# Patient Record
Sex: FEMALE | Race: ASIAN | NOT HISPANIC OR LATINO | URBAN - METROPOLITAN AREA
[De-identification: names, ages, dates, MRNs, and addresses within clinical notes are randomized per-mention and may not be internally consistent; named-entity substitution may affect disease eponyms.]

---

## 2019-05-01 ENCOUNTER — OUTPATIENT (OUTPATIENT)
Dept: OUTPATIENT SERVICES | Facility: HOSPITAL | Age: 84
LOS: 1 days | End: 2019-05-01
Payer: MEDICAID

## 2019-05-01 PROCEDURE — G9001: CPT

## 2019-05-20 ENCOUNTER — EMERGENCY (EMERGENCY)
Facility: HOSPITAL | Age: 84
LOS: 1 days | Discharge: HOME | End: 2019-05-20
Attending: EMERGENCY MEDICINE
Payer: MEDICAID

## 2019-05-20 VITALS
WEIGHT: 160.06 LBS | TEMPERATURE: 99 F | HEART RATE: 70 BPM | OXYGEN SATURATION: 97 % | RESPIRATION RATE: 20 BRPM | SYSTOLIC BLOOD PRESSURE: 128 MMHG | DIASTOLIC BLOOD PRESSURE: 72 MMHG

## 2019-05-20 VITALS
TEMPERATURE: 98 F | HEART RATE: 73 BPM | RESPIRATION RATE: 18 BRPM | DIASTOLIC BLOOD PRESSURE: 80 MMHG | SYSTOLIC BLOOD PRESSURE: 131 MMHG | OXYGEN SATURATION: 96 %

## 2019-05-20 DIAGNOSIS — Z90.49 ACQUIRED ABSENCE OF OTHER SPECIFIED PARTS OF DIGESTIVE TRACT: Chronic | ICD-10-CM

## 2019-05-20 DIAGNOSIS — Z90.710 ACQUIRED ABSENCE OF BOTH CERVIX AND UTERUS: Chronic | ICD-10-CM

## 2019-05-20 LAB
ALBUMIN SERPL ELPH-MCNC: 4.2 G/DL — SIGNIFICANT CHANGE UP (ref 3.3–5)
ALP SERPL-CCNC: 56 U/L — SIGNIFICANT CHANGE UP (ref 40–120)
ALT FLD-CCNC: 34 U/L — SIGNIFICANT CHANGE UP (ref 10–45)
ANION GAP SERPL CALC-SCNC: 15 MMOL/L — SIGNIFICANT CHANGE UP (ref 5–17)
AST SERPL-CCNC: 33 U/L — SIGNIFICANT CHANGE UP (ref 10–40)
BILIRUB SERPL-MCNC: 0.7 MG/DL — SIGNIFICANT CHANGE UP (ref 0.2–1.2)
BUN SERPL-MCNC: 13 MG/DL — SIGNIFICANT CHANGE UP (ref 7–23)
CALCIUM SERPL-MCNC: 9.3 MG/DL — SIGNIFICANT CHANGE UP (ref 8.4–10.5)
CHLORIDE SERPL-SCNC: 100 MMOL/L — SIGNIFICANT CHANGE UP (ref 96–108)
CO2 SERPL-SCNC: 22 MMOL/L — SIGNIFICANT CHANGE UP (ref 22–31)
CREAT SERPL-MCNC: 0.86 MG/DL — SIGNIFICANT CHANGE UP (ref 0.5–1.3)
GLUCOSE SERPL-MCNC: 213 MG/DL — HIGH (ref 70–99)
HCT VFR BLD CALC: 42 % — SIGNIFICANT CHANGE UP (ref 34.5–45)
HGB BLD-MCNC: 14.4 G/DL — SIGNIFICANT CHANGE UP (ref 11.5–15.5)
MCHC RBC-ENTMCNC: 31.3 PG — SIGNIFICANT CHANGE UP (ref 27–34)
MCHC RBC-ENTMCNC: 34.2 GM/DL — SIGNIFICANT CHANGE UP (ref 32–36)
MCV RBC AUTO: 91.5 FL — SIGNIFICANT CHANGE UP (ref 80–100)
PLATELET # BLD AUTO: 160 K/UL — SIGNIFICANT CHANGE UP (ref 150–400)
POTASSIUM SERPL-MCNC: 3.9 MMOL/L — SIGNIFICANT CHANGE UP (ref 3.5–5.3)
POTASSIUM SERPL-SCNC: 3.9 MMOL/L — SIGNIFICANT CHANGE UP (ref 3.5–5.3)
PROT SERPL-MCNC: 6.9 G/DL — SIGNIFICANT CHANGE UP (ref 6–8.3)
RBC # BLD: 4.59 M/UL — SIGNIFICANT CHANGE UP (ref 3.8–5.2)
RBC # FLD: 11.8 % — SIGNIFICANT CHANGE UP (ref 10.3–14.5)
SODIUM SERPL-SCNC: 137 MMOL/L — SIGNIFICANT CHANGE UP (ref 135–145)
WBC # BLD: 3.4 K/UL — LOW (ref 3.8–10.5)
WBC # FLD AUTO: 3.4 K/UL — LOW (ref 3.8–10.5)

## 2019-05-20 PROCEDURE — 93005 ELECTROCARDIOGRAM TRACING: CPT

## 2019-05-20 PROCEDURE — 80053 COMPREHEN METABOLIC PANEL: CPT

## 2019-05-20 PROCEDURE — 71046 X-RAY EXAM CHEST 2 VIEWS: CPT | Mod: 26

## 2019-05-20 PROCEDURE — 99284 EMERGENCY DEPT VISIT MOD MDM: CPT | Mod: 25

## 2019-05-20 PROCEDURE — 85027 COMPLETE CBC AUTOMATED: CPT

## 2019-05-20 PROCEDURE — 93010 ELECTROCARDIOGRAM REPORT: CPT

## 2019-05-20 PROCEDURE — 71046 X-RAY EXAM CHEST 2 VIEWS: CPT

## 2019-05-20 PROCEDURE — 96374 THER/PROPH/DIAG INJ IV PUSH: CPT

## 2019-05-20 RX ORDER — IBUPROFEN 200 MG
400 TABLET ORAL ONCE
Refills: 0 | Status: COMPLETED | OUTPATIENT
Start: 2019-05-20 | End: 2019-05-20

## 2019-05-20 RX ORDER — CIPROFLOXACIN LACTATE 400MG/40ML
1 VIAL (ML) INTRAVENOUS
Qty: 4 | Refills: 0
Start: 2019-05-20 | End: 2019-05-23

## 2019-05-20 RX ADMIN — Medication 400 MILLIGRAM(S): at 14:41

## 2019-05-20 RX ADMIN — Medication 400 MILLIGRAM(S): at 13:06

## 2019-05-20 NOTE — ED PROVIDER NOTE - OBJECTIVE STATEMENT
82 yo F PMHx DM, HTN, A-fib (on ASA, no other anticoagulation), recurrent bronchitis presents with 2 wk h/o fevers, chills, productive cough, associated CP, upper abd pain. She reports being prescribed outpatient Abx with her PCP, but does not remember the name of it, which has reduced sputum, but hasn't resolved fevers, chills, cough. She describes generalized body aches for 2 weeks. She also reports some nasal congestion, worsening of cough when laying flat, and associated HAs. She goes to a senior center daily and reports other seniors with similar symptoms there. She received her flu shot 6 months ago. She denies h/o smoking or COPD.    Home Meds:   Glipizide  Janumet  Irbesartan   Amlodipine   Atrovastatin   Bisoprolol   Vit D, Vit B complex, CoQ, fish oil   Melatonin

## 2019-05-20 NOTE — ED ADULT NURSE NOTE - NSIMPLEMENTINTERV_GEN_ALL_ED
Implemented All Universal Safety Interventions:  [place] to call system. Call bell, personal items and telephone within reach. Instruct patient to call for assistance. Room bathroom lighting operational. Non-slip footwear when patient is off stretcher. Physically safe environment: no spills, clutter or unnecessary equipment. Stretcher in lowest position, wheels locked, appropriate side rails in place.

## 2019-05-20 NOTE — ED PROVIDER NOTE - CLINICAL SUMMARY MEDICAL DECISION MAKING FREE TEXT BOX
82 yo F with PMHx DM, HTN, A-fib (on ASA), recurrent bronchitis presents with 2-week h/o fevers, chills, generalized body aches, initially productive cough resolved somewhat on outpatient Abx, likely Azithromycin. She reports persistent fevers and cough despite tx. LLL crackles exam consisted with likely unresolved CAP. Vitals wnl. Afebrile in ED. CP less likely to be cardiac in etiology and most likely associated with persistent cough. F/u EKG. F/u CXR. 82 yo F with PMHx DM, HTN, A-fib (on ASA), recurrent bronchitis presents with 2-week h/o fevers, chills, generalized body aches, initially productive cough resolved somewhat on outpatient Abx, likely Azithromycin. She reports persistent fevers and cough despite tx. LLL crackles exam consisted with likely unresolved CAP. Vitals wnl. Afebrile in ED. CP less likely to be cardiac in etiology and most likely associated with persistent cough. EKG wnl. CXR without consolidations. 84 yo F with PMHx DM, HTN, A-fib (on ASA), recurrent bronchitis presents with 2-week h/o fevers, chills, generalized body aches, initially productive cough resolved somewhat on outpatient Abx, likely Azithromycin. She reports persistent fevers and cough despite tx. LLL crackles exam consisted with likely unresolved CAP. Vitals wnl. Afebrile in ED. CP less likely to be cardiac in etiology and most likely associated with persistent cough. EKG wnl. CXR without consolidations.    Attending Statement: Agree with the above.  Cough, subjective fevers, 6 days, not worsening; presents bc cough is persistent.  No other complaints.  No n/v.  No documented fevers.  VSS in ED and patient nontoxic.  Exam demonstrates LLL crackles.  Likely PNA given hx and exam.  Will need labs, CXR, abx.  Reassess.   --BMM

## 2019-05-20 NOTE — ED PROVIDER NOTE - NSFOLLOWUPINSTRUCTIONS_ED_ALL_ED_FT
Your diagnosis: Community Acquired Pneumonia     Discharge instructions:  1. Please follow up with your Primary Care Doctor in 1-2 days.  2. Take any prescribed medications as instructed: Levofloxacin 750mg daily for 4 days   3. Be sure to return to the ED if you develop new or worsening symptoms. Specific signs and symptoms to be vigilant of: fever or chills, chest pain, difficulty breathing, palpitations, severe coughing, blood in the sputum.

## 2019-05-20 NOTE — ED ADULT NURSE NOTE - OBJECTIVE STATEMENT
84 y/o F, A&Ox4, Mandarin-speaking, enters ED w/ c/o fever for the past 4 days and cough for the past 2 weeks. Hx. of bronchitis. Daughter reports they have not been measuring pt.'s temperature, however, her "whole body gets very warm" accompanied by body aches. Pt. last took 1 extra strength Tylenol at 0730 this AM. Pt. presents afebrile - 99.6 rectally. Pt. also reports productive cough w/ yellow sputum. Pt. endorses chest pain r/t coughing and pt. also reports abdominal pain which she believes is r/t medications that she has been taking. Abdomen soft, round, nontender. No n/v. Pt. finished abx. regimen prescribed by PCP (pt. and daughter unsure of which abx. and what it was prescribed for) this past Friday. Pt. endorses some SOB. Pt.'s O2 sat 98% on RA - pt. in no acute respiratory distress. Lung sounds clear in the upper fields, diminished in the lower fields. No dysuria/hematuria. No rash. No recent travel. Skin warm, dry and intact. Safety and comfort provided. Daughter at bedside.

## 2019-05-20 NOTE — ED PROVIDER NOTE - NS ED ROS FT
REVIEW OF SYSTEMS:    CONSTITUTIONAL: +generalized weakness, +fevers  EYES/ENT: No visual changes;  No vertigo or throat pain   NECK: No pain or stiffness  RESPIRATORY: +cough, denies wheezing, hemoptysis; No shortness of breath  CARDIOVASCULAR: +chest pain associated with cough, denies palpitations or CP at rest   GASTROINTESTINAL: +upper abd pain associated with cough, No nausea, vomiting, or hematemesis; No diarrhea or constipation. No melena or hematochezia.  GENITOURINARY: No dysuria, frequency or hematuria  NEUROLOGICAL: No numbness or weakness  SKIN: No itching, rashes

## 2019-05-20 NOTE — ED PROVIDER NOTE - PHYSICAL EXAMINATION
Physical Exam:   General: sitting comfortably in bed, NAD   Neck: supple, full ROM, no lymphadenopathy  CV: RR S1S2  Lungs: LLL crackles, good air flow b/l   Back: No CVA tenderness  Abd: Soft, NTND  Ext: NT b/l, no edema Physical Exam:   General: sitting comfortably in bed, NAD   ENT: no sinus tenderness, neck supple, full ROM, no lymphadenopathy  CV: RR S1S2  Lungs: LLL crackles, good air flow b/l   Back: No CVA tenderness  Abd: Soft, NTND  Ext: NT b/l, no edema

## 2019-05-21 DIAGNOSIS — Z71.89 OTHER SPECIFIED COUNSELING: ICD-10-CM

## 2019-09-20 ENCOUNTER — EMERGENCY (EMERGENCY)
Facility: HOSPITAL | Age: 84
LOS: 1 days | Discharge: HOME | End: 2019-09-20
Attending: EMERGENCY MEDICINE
Payer: MEDICAID

## 2019-09-20 VITALS
WEIGHT: 158.07 LBS | DIASTOLIC BLOOD PRESSURE: 73 MMHG | TEMPERATURE: 98 F | HEART RATE: 77 BPM | SYSTOLIC BLOOD PRESSURE: 117 MMHG | HEIGHT: 64 IN | OXYGEN SATURATION: 97 % | RESPIRATION RATE: 16 BRPM

## 2019-09-20 VITALS
TEMPERATURE: 98 F | SYSTOLIC BLOOD PRESSURE: 127 MMHG | DIASTOLIC BLOOD PRESSURE: 77 MMHG | OXYGEN SATURATION: 96 % | RESPIRATION RATE: 18 BRPM | HEART RATE: 60 BPM

## 2019-09-20 DIAGNOSIS — Z90.710 ACQUIRED ABSENCE OF BOTH CERVIX AND UTERUS: Chronic | ICD-10-CM

## 2019-09-20 DIAGNOSIS — Z90.49 ACQUIRED ABSENCE OF OTHER SPECIFIED PARTS OF DIGESTIVE TRACT: Chronic | ICD-10-CM

## 2019-09-20 PROBLEM — E11.9 TYPE 2 DIABETES MELLITUS WITHOUT COMPLICATIONS: Chronic | Status: ACTIVE | Noted: 2019-05-20

## 2019-09-20 PROBLEM — I10 ESSENTIAL (PRIMARY) HYPERTENSION: Chronic | Status: ACTIVE | Noted: 2019-05-20

## 2019-09-20 LAB
APPEARANCE UR: CLEAR — SIGNIFICANT CHANGE UP
BACTERIA # UR AUTO: NEGATIVE — SIGNIFICANT CHANGE UP
BILIRUB UR-MCNC: NEGATIVE — SIGNIFICANT CHANGE UP
COLOR SPEC: SIGNIFICANT CHANGE UP
DIFF PNL FLD: NEGATIVE — SIGNIFICANT CHANGE UP
EPI CELLS # UR: 0 /HPF — SIGNIFICANT CHANGE UP
GLUCOSE UR QL: ABNORMAL
HYALINE CASTS # UR AUTO: 0 /LPF — SIGNIFICANT CHANGE UP (ref 0–2)
KETONES UR-MCNC: NEGATIVE — SIGNIFICANT CHANGE UP
LEUKOCYTE ESTERASE UR-ACNC: NEGATIVE — SIGNIFICANT CHANGE UP
NITRITE UR-MCNC: NEGATIVE — SIGNIFICANT CHANGE UP
PH UR: 6 — SIGNIFICANT CHANGE UP (ref 5–8)
PROT UR-MCNC: ABNORMAL
RBC CASTS # UR COMP ASSIST: 1 /HPF — SIGNIFICANT CHANGE UP (ref 0–4)
SP GR SPEC: 1.02 — SIGNIFICANT CHANGE UP (ref 1.01–1.02)
UROBILINOGEN FLD QL: NEGATIVE — SIGNIFICANT CHANGE UP
WBC UR QL: 1 /HPF — SIGNIFICANT CHANGE UP (ref 0–5)

## 2019-09-20 PROCEDURE — 99284 EMERGENCY DEPT VISIT MOD MDM: CPT

## 2019-09-20 PROCEDURE — 81001 URINALYSIS AUTO W/SCOPE: CPT

## 2019-09-20 PROCEDURE — 99283 EMERGENCY DEPT VISIT LOW MDM: CPT

## 2019-09-20 PROCEDURE — 93005 ELECTROCARDIOGRAM TRACING: CPT

## 2019-09-20 RX ORDER — METHOCARBAMOL 500 MG/1
750 TABLET, FILM COATED ORAL ONCE
Refills: 0 | Status: COMPLETED | OUTPATIENT
Start: 2019-09-20 | End: 2019-09-20

## 2019-09-20 RX ORDER — ACETAMINOPHEN 500 MG
650 TABLET ORAL ONCE
Refills: 0 | Status: COMPLETED | OUTPATIENT
Start: 2019-09-20 | End: 2019-09-20

## 2019-09-20 RX ORDER — LIDOCAINE 4 G/100G
1 CREAM TOPICAL ONCE
Refills: 0 | Status: COMPLETED | OUTPATIENT
Start: 2019-09-20 | End: 2019-09-20

## 2019-09-20 RX ORDER — DIAZEPAM 5 MG
1 TABLET ORAL
Qty: 9 | Refills: 0
Start: 2019-09-20 | End: 2019-09-22

## 2019-09-20 RX ORDER — IBUPROFEN 200 MG
600 TABLET ORAL ONCE
Refills: 0 | Status: COMPLETED | OUTPATIENT
Start: 2019-09-20 | End: 2019-09-20

## 2019-09-20 RX ORDER — DIAZEPAM 5 MG
2 TABLET ORAL ONCE
Refills: 0 | Status: DISCONTINUED | OUTPATIENT
Start: 2019-09-20 | End: 2019-09-20

## 2019-09-20 RX ADMIN — LIDOCAINE 1 PATCH: 4 CREAM TOPICAL at 13:04

## 2019-09-20 RX ADMIN — METHOCARBAMOL 750 MILLIGRAM(S): 500 TABLET, FILM COATED ORAL at 13:04

## 2019-09-20 RX ADMIN — Medication 650 MILLIGRAM(S): at 11:08

## 2019-09-20 RX ADMIN — Medication 600 MILLIGRAM(S): at 11:18

## 2019-09-20 RX ADMIN — Medication 2 MILLIGRAM(S): at 11:18

## 2019-09-20 RX ADMIN — Medication 650 MILLIGRAM(S): at 12:00

## 2019-09-20 RX ADMIN — Medication 600 MILLIGRAM(S): at 12:00

## 2019-09-20 NOTE — ED PROVIDER NOTE - OBJECTIVE STATEMENT
84F, h.o HTN, DM, afib p.w acute on chronic back pain since yesterday. Pt has lower back pain for 2weeks and waiting for MRI. Pt states worsening sharp pain radiating down the left leg last night. Pain is manageable when not moving. Denied any numbness/tingling, weakness of the extremity, urinary issues, saddle anesthesia. No fever, travel hx, rashes, or trauma to the back or leg.

## 2019-09-20 NOTE — ED PROVIDER NOTE - PHYSICAL EXAMINATION
General: NAD, good hygiene, well developed  HENT: Atraumatic, EOMI, no conjunctivae injection, moist mucosa, no post. oropharynx erythema, exudates  Neck: normal ROM and trachea midline   Cardiovascular: RRR, S1&2, no M or R, radial pulses equal and b/l  Respiratory: CTABL, no wheezes or crackles, no decreased breath sounds  Abdominal: soft and non-tender non distended, neg for guarding, corrales's sign, rovsing's sign, mcburney's sign, no CVA tenderness   Extremities: no edema of the legs/feet, DP/PT equal b/l  Skin: warm, well perfused  Neurologic: nonfocal, AAOx3, able to ambulate with a limp and cane. Strength and sensation equal and b/l   Psych: normal mood and affect

## 2019-09-20 NOTE — ED ADULT NURSE NOTE - CHPI ED NUR SYMPTOMS NEG
no tingling/no numbness/no bowel dysfunction/no constipation/no bladder dysfunction no constipation/no neck tenderness/no tingling/no bladder dysfunction/no bowel dysfunction/no motor function loss/no numbness

## 2019-09-20 NOTE — ED PROVIDER NOTE - NS ED ROS FT
GENERAL: No fever or chills, weight changes, nightsweats  EYES: no change in vision  HEENT: no dysplasia, odynophagia, ear pain, rhinorrhea, epistasis   CARDIAC: no chest pain, palpitation   PULMONARY: no cough or SOB  GI: no abdominal pain, no nausea or no vomiting, no diarrhea or constipation  : No changes in urination for pain/freq.   SKIN: no rashes   NEURO: no headache, numbness/tingling, extremity weakness   MSK: back pain

## 2019-09-20 NOTE — ED PROVIDER NOTE - PATIENT PORTAL LINK FT
You can access the FollowMyHealth Patient Portal offered by [facility] by registering at the following website: [email or web address]. By joining [facility]’s FollowMyHealth portal, you will also be able to view your health information using other applications (apps) compatible with our system.

## 2019-09-20 NOTE — ED PROVIDER NOTE - CLINICAL SUMMARY MEDICAL DECISION MAKING FREE TEXT BOX
worsening lower back pain last night, no sign of LOGAN, still able to ambulate, will pain control and re-eval. No sign of infection or trauma. worsening lower back pain last night, no sign of LOGAN, still able to ambulate, will pain control and re-eval. No sign of infection or trauma.  ATTG: : back pain, check ua, pain medication, re eval for dispo

## 2019-09-20 NOTE — ED ADULT NURSE NOTE - OBJECTIVE STATEMENT
85 YO f presents with back pain.  For the past month has had right sided pain that last night switched sides.  She had a fall 3 months ago that was not related to current pain.  At rest pain is 4 with movement pain is 8.  No pain when pressure is applied. Patient has no numbness or tingling in either lower limb. Can walk but has difficulty, uses cane. states that has no incontinence of stool or urine, but when has a bowel movement pain is slightly relieved. no h/a, chest pain, SOB, n/v 83 YO f presents with back pain. Patient primarily Mandarin speaking with daughter at bedside to assist in translation, declining translation services at this time. Per daughter, For the past month has had right sided back pain, radiating down leg, that last night switched to L side.  At rest pain is 4 with movement pain is 8.  No pain when pressure is applied. Patient has no numbness or tingling in either lower limb. Can walk but has difficulty, uses cane. states that has no incontinence of stool or urine, but when has a bowel movement pain is slightly relieved. Denies recent falls, trauma or strenuous activity prior to having pain. no h/a, chest pain, SOB, n/v. Per daughter, patient is following with PMD and is planning for MRI pending insurance coverage. Patient was prescribed pain medications but has not picked them up from pharmacy yet. Pt took gabapentin this morning with little relief of pain- denies taking other pain meds PTA. Chinese herbal medication patch in place to lower back.

## 2019-09-20 NOTE — ED ADULT NURSE NOTE - NSIMPLEMENTINTERV_GEN_ALL_ED
Implemented All Fall with Harm Risk Interventions:  [place] to call system. Call bell, personal items and telephone within reach. Instruct patient to call for assistance. Room bathroom lighting operational. Non-slip footwear when patient is off stretcher. Physically safe environment: no spills, clutter or unnecessary equipment. Stretcher in lowest position, wheels locked, appropriate side rails in place. Provide visual cue, wrist band, yellow gown, etc. Monitor gait and stability. Monitor for mental status changes and reorient to person, place, and time. Review medications for side effects contributing to fall risk. Reinforce activity limits and safety measures with patient and family. Provide visual clues: red socks.

## 2019-09-20 NOTE — ED PROVIDER NOTE - PROGRESS NOTE DETAILS
ATTG: : I called Dr. Camargo her Neurosurg and reviewed her old imaging. has MRI from [facility]. is pending an MRI L spine. agree with impression, since patient ambulating and feeling better can follow up with him as an outpt.

## 2019-09-20 NOTE — ED PROVIDER NOTE - ATTENDING CONTRIBUTION TO CARE
85 y/o f with pmhx HTN, DM, a fib, chronic back pain upper and lower, presents for left side back pain lower back. patient had this pain in the past on the right side and now on the left. pain more sever but similar type, radiates down the leg to the knee. no fever or chills no urinary complaints. no incontinence. no retention. no saddle anesthesia. No fever, travel hx, rashes, or trauma to the back or leg.  Gen. no acute distress  HEENT:  perrl eomi   Lungs:  b/l BS  CVS: S1S2   Abd;  soft non tender no distention  Ext: no pedal edema  Neuro: aaox3 no focal deficits, DTRs 2+/4 b/l L4, sensation intact distal lower ext and equal b/l  MSK: strength 5/5 b/l upper and lower

## 2019-09-20 NOTE — ED PROVIDER NOTE - NSFOLLOWUPINSTRUCTIONS_ED_ALL_ED_FT
Thank you for visiting our Emergency Department, it has been a pleasure taking part in your healthcare. Please follow up with your primary doctor within x48 hours.    Your discharge diagnosis is: lower back pain  Please take over the counter pain medication such as motrin and tylenol for pain and use the lidocaine patch for pain control.    Follow up with your primary care doctor.     A copy of resulted labs, imaging, and findings have been provided to you.   You have had a detailed discussion with your provider regarding your diagnosis, management and discharge plan. You have been given the opportunity to have your questions answered. At this time you have been deemed stable and fit for discharge.    Return precautions to the Emergency Department include but are not limited to: unrelenting nausea, vomiting, fever, chills, chest pain, shortness of breath, dizziness, abdominal pain, worsening pain, syncope, blood in urine or stool, headache that doesn't resolve, numbness or tingling, loss of sensation, loss of motor function, or any other concerning symptoms. Thank you for visiting our Emergency Department, it has been a pleasure taking part in your healthcare. Please follow up with your primary doctor within x48 hours.    Your discharge diagnosis is: lower back pain  Please take over the counter pain medication such as motrin and tylenol for pain and use the lidocaine patch for pain control. Take valium as needed and don't exceed 15mg per day.   Follow up with your primary care doctor.     A copy of resulted labs, imaging, and findings have been provided to you.   You have had a detailed discussion with your provider regarding your diagnosis, management and discharge plan. You have been given the opportunity to have your questions answered. At this time you have been deemed stable and fit for discharge.    Return precautions to the Emergency Department include but are not limited to: unrelenting nausea, vomiting, fever, chills, chest pain, shortness of breath, dizziness, abdominal pain, worsening pain, syncope, blood in urine or stool, headache that doesn't resolve, numbness or tingling, loss of sensation, loss of motor function, or any other concerning symptoms.

## 2019-09-20 NOTE — ED ADULT NURSE NOTE - NURSING MUSC EXTREMITY NORMAL ROM
right upper extremity/left upper extremity right lower extremity/left lower extremity/left upper extremity/right upper extremity

## 2019-11-07 NOTE — ED ADULT NURSE NOTE - NS_NURSE_DISC_ED_ALL_ED_PROVIDEDBY
Departed care via wheelchair with family and [facility] staff. Family and patient verbalize understanding of all discharge instructions provided. No distress noted at this time.   ED MD

## 2020-05-27 ENCOUNTER — EMERGENCY (EMERGENCY)
Facility: HOSPITAL | Age: 85
LOS: 1 days | Discharge: HOME | End: 2020-05-27
Attending: EMERGENCY MEDICINE
Payer: MEDICAID

## 2020-05-27 VITALS
RESPIRATION RATE: 16 BRPM | OXYGEN SATURATION: 98 % | HEART RATE: 65 BPM | WEIGHT: 156.09 LBS | DIASTOLIC BLOOD PRESSURE: 71 MMHG | HEIGHT: 65 IN | TEMPERATURE: 98 F | SYSTOLIC BLOOD PRESSURE: 118 MMHG

## 2020-05-27 DIAGNOSIS — Z90.710 ACQUIRED ABSENCE OF BOTH CERVIX AND UTERUS: Chronic | ICD-10-CM

## 2020-05-27 DIAGNOSIS — Z90.49 ACQUIRED ABSENCE OF OTHER SPECIFIED PARTS OF DIGESTIVE TRACT: Chronic | ICD-10-CM

## 2020-05-27 LAB
ALBUMIN SERPL ELPH-MCNC: 4.3 G/DL — SIGNIFICANT CHANGE UP (ref 3.3–5)
ALP SERPL-CCNC: 46 U/L — SIGNIFICANT CHANGE UP (ref 40–120)
ALT FLD-CCNC: 31 U/L — SIGNIFICANT CHANGE UP (ref 10–45)
ANION GAP SERPL CALC-SCNC: 13 MMOL/L — SIGNIFICANT CHANGE UP (ref 5–17)
APPEARANCE UR: CLEAR — SIGNIFICANT CHANGE UP
APTT BLD: 32 SEC — SIGNIFICANT CHANGE UP (ref 27.5–36.3)
AST SERPL-CCNC: 21 U/L — SIGNIFICANT CHANGE UP (ref 10–40)
BASOPHILS # BLD AUTO: 0.03 K/UL — SIGNIFICANT CHANGE UP (ref 0–0.2)
BASOPHILS NFR BLD AUTO: 0.4 % — SIGNIFICANT CHANGE UP (ref 0–2)
BILIRUB SERPL-MCNC: 2.1 MG/DL — HIGH (ref 0.2–1.2)
BILIRUB UR-MCNC: NEGATIVE — SIGNIFICANT CHANGE UP
BUN SERPL-MCNC: 13 MG/DL — SIGNIFICANT CHANGE UP (ref 7–23)
CALCIUM SERPL-MCNC: 9.5 MG/DL — SIGNIFICANT CHANGE UP (ref 8.4–10.5)
CHLORIDE SERPL-SCNC: 101 MMOL/L — SIGNIFICANT CHANGE UP (ref 96–108)
CO2 SERPL-SCNC: 23 MMOL/L — SIGNIFICANT CHANGE UP (ref 22–31)
COLOR SPEC: SIGNIFICANT CHANGE UP
CREAT SERPL-MCNC: 0.78 MG/DL — SIGNIFICANT CHANGE UP (ref 0.5–1.3)
DIFF PNL FLD: NEGATIVE — SIGNIFICANT CHANGE UP
EOSINOPHIL # BLD AUTO: 0.17 K/UL — SIGNIFICANT CHANGE UP (ref 0–0.5)
EOSINOPHIL NFR BLD AUTO: 2.3 % — SIGNIFICANT CHANGE UP (ref 0–6)
GLUCOSE SERPL-MCNC: 143 MG/DL — HIGH (ref 70–99)
GLUCOSE UR QL: NEGATIVE — SIGNIFICANT CHANGE UP
HCT VFR BLD CALC: 40.6 % — SIGNIFICANT CHANGE UP (ref 34.5–45)
HGB BLD-MCNC: 13.8 G/DL — SIGNIFICANT CHANGE UP (ref 11.5–15.5)
IMM GRANULOCYTES NFR BLD AUTO: 0.3 % — SIGNIFICANT CHANGE UP (ref 0–1.5)
INR BLD: 1.07 RATIO — SIGNIFICANT CHANGE UP (ref 0.88–1.16)
KETONES UR-MCNC: NEGATIVE — SIGNIFICANT CHANGE UP
LEUKOCYTE ESTERASE UR-ACNC: ABNORMAL
LYMPHOCYTES # BLD AUTO: 3 K/UL — SIGNIFICANT CHANGE UP (ref 1–3.3)
LYMPHOCYTES # BLD AUTO: 40.2 % — SIGNIFICANT CHANGE UP (ref 13–44)
MAGNESIUM SERPL-MCNC: 2.2 MG/DL — SIGNIFICANT CHANGE UP (ref 1.6–2.6)
MCHC RBC-ENTMCNC: 31.1 PG — SIGNIFICANT CHANGE UP (ref 27–34)
MCHC RBC-ENTMCNC: 34 GM/DL — SIGNIFICANT CHANGE UP (ref 32–36)
MCV RBC AUTO: 91.4 FL — SIGNIFICANT CHANGE UP (ref 80–100)
MONOCYTES # BLD AUTO: 0.48 K/UL — SIGNIFICANT CHANGE UP (ref 0–0.9)
MONOCYTES NFR BLD AUTO: 6.4 % — SIGNIFICANT CHANGE UP (ref 2–14)
NEUTROPHILS # BLD AUTO: 3.76 K/UL — SIGNIFICANT CHANGE UP (ref 1.8–7.4)
NEUTROPHILS NFR BLD AUTO: 50.4 % — SIGNIFICANT CHANGE UP (ref 43–77)
NITRITE UR-MCNC: NEGATIVE — SIGNIFICANT CHANGE UP
NRBC # BLD: 0 /100 WBCS — SIGNIFICANT CHANGE UP (ref 0–0)
PH UR: 6 — SIGNIFICANT CHANGE UP (ref 5–8)
PHOSPHATE SERPL-MCNC: 3.8 MG/DL — SIGNIFICANT CHANGE UP (ref 2.5–4.5)
PLATELET # BLD AUTO: 182 K/UL — SIGNIFICANT CHANGE UP (ref 150–400)
POTASSIUM SERPL-MCNC: 4 MMOL/L — SIGNIFICANT CHANGE UP (ref 3.5–5.3)
POTASSIUM SERPL-SCNC: 4 MMOL/L — SIGNIFICANT CHANGE UP (ref 3.5–5.3)
PROT SERPL-MCNC: 6.8 G/DL — SIGNIFICANT CHANGE UP (ref 6–8.3)
PROT UR-MCNC: ABNORMAL
PROTHROM AB SERPL-ACNC: 12.2 SEC — SIGNIFICANT CHANGE UP (ref 10–12.9)
RBC # BLD: 4.44 M/UL — SIGNIFICANT CHANGE UP (ref 3.8–5.2)
RBC # FLD: 12.3 % — SIGNIFICANT CHANGE UP (ref 10.3–14.5)
SODIUM SERPL-SCNC: 137 MMOL/L — SIGNIFICANT CHANGE UP (ref 135–145)
SP GR SPEC: 1.01 — LOW (ref 1.01–1.02)
UROBILINOGEN FLD QL: NEGATIVE — SIGNIFICANT CHANGE UP
WBC # BLD: 7.46 K/UL — SIGNIFICANT CHANGE UP (ref 3.8–10.5)
WBC # FLD AUTO: 7.46 K/UL — SIGNIFICANT CHANGE UP (ref 3.8–10.5)

## 2020-05-27 PROCEDURE — 96374 THER/PROPH/DIAG INJ IV PUSH: CPT

## 2020-05-27 PROCEDURE — 70450 CT HEAD/BRAIN W/O DYE: CPT

## 2020-05-27 PROCEDURE — 82962 GLUCOSE BLOOD TEST: CPT

## 2020-05-27 PROCEDURE — 93010 ELECTROCARDIOGRAM REPORT: CPT

## 2020-05-27 PROCEDURE — 99285 EMERGENCY DEPT VISIT HI MDM: CPT

## 2020-05-27 PROCEDURE — 85610 PROTHROMBIN TIME: CPT

## 2020-05-27 PROCEDURE — 85730 THROMBOPLASTIN TIME PARTIAL: CPT

## 2020-05-27 PROCEDURE — 84484 ASSAY OF TROPONIN QUANT: CPT

## 2020-05-27 PROCEDURE — 83735 ASSAY OF MAGNESIUM: CPT

## 2020-05-27 PROCEDURE — 70450 CT HEAD/BRAIN W/O DYE: CPT | Mod: 26

## 2020-05-27 PROCEDURE — 87086 URINE CULTURE/COLONY COUNT: CPT

## 2020-05-27 PROCEDURE — 84100 ASSAY OF PHOSPHORUS: CPT

## 2020-05-27 PROCEDURE — 82607 VITAMIN B-12: CPT

## 2020-05-27 PROCEDURE — 93005 ELECTROCARDIOGRAM TRACING: CPT

## 2020-05-27 PROCEDURE — 99284 EMERGENCY DEPT VISIT MOD MDM: CPT | Mod: 25

## 2020-05-27 PROCEDURE — 84443 ASSAY THYROID STIM HORMONE: CPT

## 2020-05-27 PROCEDURE — 85027 COMPLETE CBC AUTOMATED: CPT

## 2020-05-27 PROCEDURE — 80053 COMPREHEN METABOLIC PANEL: CPT

## 2020-05-27 PROCEDURE — 81001 URINALYSIS AUTO W/SCOPE: CPT

## 2020-05-27 PROCEDURE — 82746 ASSAY OF FOLIC ACID SERUM: CPT

## 2020-05-27 RX ORDER — CEFTRIAXONE 500 MG/1
1000 INJECTION, POWDER, FOR SOLUTION INTRAMUSCULAR; INTRAVENOUS ONCE
Refills: 0 | Status: COMPLETED | OUTPATIENT
Start: 2020-05-27 | End: 2020-05-27

## 2020-05-27 RX ADMIN — CEFTRIAXONE 100 MILLIGRAM(S): 500 INJECTION, POWDER, FOR SOLUTION INTRAMUSCULAR; INTRAVENOUS at 23:13

## 2020-05-27 NOTE — ED PROVIDER NOTE - CLINICAL SUMMARY MEDICAL DECISION MAKING FREE TEXT BOX
85 y/o F PMHx afib, HTN, T2DM, presents with AMS and weakness. Currently NSR, will obtain labs, UA/UCx, EKG, and CT Head NC. 83 y/o F PMHx afib, HTN, T2DM, presents with AMS and weakness. Currently NSR, will obtain labs, UA/UCx, EKG, and CT Head NC.    Attn - pt with progressive weakness, anorexia and now with AMS - CT head, EKG, labs, UA, r/o ICH, mass, infection, metabolic - TSH, Trop.

## 2020-05-27 NOTE — ED PROVIDER NOTE - NS ED ROS FT
CONSTITUTIONAL: No fever, no chills, no weight loss  EYES: No eye pain, no visual disturbance  RESPIRATORY: No cough, No SOB  CARDIOVASCULAR: No CP, no palpitations  GASTROINTESTINAL: no abdominal pain, no n/v/d  GENITOURINARY: No dysuria, no hematuria  HEME: No easy bruising, no bleeding gums  NEURO: No headaches, no weakness  SKIN: No itching, no rashes  MSK: No joint pain, no joint swelling

## 2020-05-27 NOTE — ED PROVIDER NOTE - OBJECTIVE STATEMENT
83 y/o F PMHx prior afib (previously on AC, d/c'd 2 years ago), T2DM, HTN, HLD, NAFLD, presented with AMS x 1 day and generalized weakness x 2 weeks. Daughter at bedside, reports pt has been generally weak and more sleepy over last 2 weeks. At baseline she is AAOx4, retired , ambulates independently, lives with daughter. This morning, daughter noted patient to be confused, forgetful, didn't know todays date or her birthday, did not remember where she was. No focal weakness, no LOC, no recent falls or head trauma. No new medications, no recent infections. Denies fever/chills, cough, SOB, N/V, diarrhea, abdominal pain, dysuria, runny nose, sore throat, or headache. 85 y/o F PMHx prior afib (previously on AC, d/c'd 2 years ago), T2DM, HTN, HLD, NAFLD, presented with AMS x 1 day and generalized weakness x 2 weeks. Daughter at bedside, reports pt has been generally weak and more sleepy over last 2 weeks. At baseline she is AAOx4, retired , ambulates independently, lives with daughter. This morning, daughter noted patient to be confused, forgetful, didn't know todays date or her birthday, did not remember where she was. No focal weakness, no LOC, no recent falls or head trauma. No new medications, no recent infections. Denies fever/chills, cough, SOB, N/V, diarrhea, abdominal pain, dysuria, runny nose, sore throat, or headache.    Of note, daughter reports patient has a hx of afib, diagnosed during an flu infection 4 years ago, was previously on AC but discontinued 2 years ago after having an episode of bleeding s/p dental procedure. 83 y/o F PMHx prior afib (previously on AC, d/c'd 2 years ago), T2DM, HTN, HLD, fatty liver, and remote cholecystectomy, presented with AMS x 1 day and generalized weakness x 2 weeks. Daughter at bedside, reports pt has been generally weak and more sleepy over last 2 weeks. At baseline she is AAOx4, retired , ambulates independently, lives with daughter. This morning, daughter noted patient to be confused, forgetful, didn't know todays date or her birthday, did not remember where she was. No focal weakness, no LOC, no recent falls or head trauma. No new medications, no recent infections. Denies fever/chills, cough, SOB, N/V, diarrhea, abdominal pain, dysuria, runny nose, sore throat, or headache.    Of note, daughter reports patient has a hx of afib, diagnosed during an flu infection 4 years ago, was previously on AC but discontinued 2 years ago after having an episode of bleeding s/p dental procedure. 83 y/o F PMHx prior afib (previously on AC, d/c'd 2 years ago), T2DM, HTN, HLD, fatty liver, and remote cholecystectomy, presented with AMS x 1 day and generalized weakness x 2 weeks. Daughter at bedside, reports pt has been generally weak and more sleepy over last 2 weeks. At baseline she is AAOx4, retired , ambulates independently, lives with daughter. This morning, daughter noted patient to be confused, forgetful, didn't know todays date or her birthday, did not remember where she was. No focal weakness, no LOC, no recent falls or head trauma. No new medications, no recent infections. Denies fever/chills, cough, SOB, N/V, diarrhea, abdominal pain, dysuria, runny nose, sore throat, or headache.     Attn - pt seen in [place] - agree with above -  hx from daughter - pt with increased weakness and anorexia x 2 weeks with confusion and acute change in memory today.  NO: fever, sob, peng, cough, cp, abdo pain,  sympt, ha, or other complaints.  Pt reportedly very independent and mentally sharp prior to this.   PMHx - DM, HTN.      Of note, daughter reports patient has a hx of afib, diagnosed during an flu infection 4 years ago, was previously on AC but discontinued 2 years ago after having an episode of bleeding s/p dental procedure.

## 2020-05-27 NOTE — ED ADULT NURSE NOTE - NSIMPLEMENTINTERV_GEN_ALL_ED
Implemented All Fall Risk Interventions:  [place] to call system. Call bell, personal items and telephone within reach. Instruct patient to call for assistance. Room bathroom lighting operational. Non-slip footwear when patient is off stretcher. Physically safe environment: no spills, clutter or unnecessary equipment. Stretcher in lowest position, wheels locked, appropriate side rails in place. Provide visual cue, wrist band, yellow gown, etc. Monitor gait and stability. Monitor for mental status changes and reorient to person, place, and time. Review medications for side effects contributing to fall risk. Reinforce activity limits and safety measures with patient and family.

## 2020-05-27 NOTE — ED ADULT NURSE NOTE - OBJECTIVE STATEMENT
83 y/o female with PMH afib, T2DM, HTN, HLD, presenting to ED for AMS x 1 day, lethargy x 3 weeks. Per daughter at bedside, pt has been acting differently and more forgetful for the past few weeks. Today, daughter became more concerned b/c pt could not remember birthday/ age/ what year it is. Daughter denies falls.  Upon exam pt A&Ox1- to self, gross neuro intact, lungs cta bilaterally, no difficulty speaking in complete sentences, s1s2 heart sounds heard, pulses x 4, [place] x4, abdomen soft nontender nondistended, skin intact, pt able to ambulate independently. Pt denies chest pain, sob, ha, n/v/d, abdominal pain, f/c, urinary symptoms, hematuria.

## 2020-05-27 NOTE — ED PROVIDER NOTE - NSFOLLOWUPINSTRUCTIONS_ED_ALL_ED_FT
You were found to have a urinary tract infection. You will be treated with antibiotic for 7 days. Please complete the antibiotic. If you experience any worsening confusion, fever, chills, abdominal pain, or burning with urination, please seek immediate medical attention. Please make sure to have a family member for direct supervision present at all times. Please have follow up with your PCP within 1-2 weeks.

## 2020-05-27 NOTE — ED PROVIDER NOTE - PHYSICAL EXAMINATION
GENERAL: NAD, well-developed  HEAD:  Atraumatic, normocephalic  EYES: EOMI, conjunctiva and sclera clear  MOUTH: MMM  NECK: Supple  LUNG: Clear to auscultation bilaterally, no increased work of breathing  CHEST:  no murmurs appreciated  ABDOMEN: Soft, nontender, nondistended  : No CVA tenderness, no suprapubic tenderness  EXTREMITIES:  no LE edema, no joint swelling  NEURO: AAOx2, no focal deficits, strength grossly intact in all extremities  SKIN: warm and dry, no visible rashes GENERAL: NAD, well-developed  HEAD:  Atraumatic, normocephalic  EYES: EOMI, conjunctiva and sclera clear  MOUTH: MMM  NECK: Supple  LUNG: Clear to auscultation bilaterally, no increased work of breathing  CHEST:  no murmurs appreciated  ABDOMEN: Soft, nontender, nondistended  : No CVA tenderness, no suprapubic tenderness  EXTREMITIES:  no LE edema, no joint swelling  NEURO: AAOx2, no focal deficits, strength grossly intact in all extremities  SKIN: warm and dry, no visible rashes     Attn - alert, no facial asymmetry, moist mm, lungs-, cor-, abdo-, extrem - trace edema, no c/t, Neuro - no focal deficits.  skin - warm and dry.

## 2020-05-27 NOTE — ED PROVIDER NOTE - PATIENT PORTAL LINK FT
You can access the FollowMyHealth Patient Portal offered by [facility] by registering at the following website: [email or web address]. By joining [facility]’s FollowMyHealth portal, you will also be able to view your health information using other applications (apps) compatible with our system.

## 2020-05-28 VITALS
HEART RATE: 65 BPM | TEMPERATURE: 98 F | OXYGEN SATURATION: 98 % | SYSTOLIC BLOOD PRESSURE: 130 MMHG | RESPIRATION RATE: 18 BRPM | DIASTOLIC BLOOD PRESSURE: 70 MMHG

## 2020-05-28 LAB
CULTURE RESULTS: SIGNIFICANT CHANGE UP
GLUCOSE BLDC GLUCOMTR-MCNC: 117 MG/DL — HIGH (ref 70–99)
SPECIMEN SOURCE: SIGNIFICANT CHANGE UP
TSH SERPL-MCNC: 1.62 UIU/ML — SIGNIFICANT CHANGE UP (ref 0.27–4.2)

## 2020-05-28 RX ORDER — CEFPODOXIME PROXETIL 100 MG
1 TABLET ORAL
Qty: 14 | Refills: 0
Start: 2020-05-28 | End: 2020-06-03

## 2020-05-28 NOTE — ED ADULT NURSE REASSESSMENT NOTE - NS ED NURSE REASSESS COMMENT FT1
Pt ambulated to bathroom with daughter. Daughter at bedside does not want  pt to be admitted.  MD Perkins aware. Awaits D/C paperwork.

## 2020-10-22 NOTE — ED PROVIDER NOTE - NS ED MD DISPO DISCHARGE
RECEIVED PT FROM ICU  TO  PER WHEELCHAIR, ALERT AND ORIENTED, ON ROOM AIR. VS STABLE. PT UPDATED WITH PLAN OF CARE. Home

## 2021-01-16 ENCOUNTER — INPATIENT (INPATIENT)
Facility: HOSPITAL | Age: 86
LOS: 2 days | Discharge: HOME | DRG: 177 | End: 2021-01-19
Attending: FAMILY MEDICINE | Admitting: FAMILY MEDICINE
Payer: MEDICARE

## 2021-01-16 ENCOUNTER — EMERGENCY (EMERGENCY)
Facility: HOSPITAL | Age: 86
LOS: 1 days | Discharge: HOME | End: 2021-01-16
Attending: EMERGENCY MEDICINE
Payer: MEDICARE

## 2021-01-16 VITALS
TEMPERATURE: 99 F | OXYGEN SATURATION: 97 % | DIASTOLIC BLOOD PRESSURE: 77 MMHG | RESPIRATION RATE: 16 BRPM | SYSTOLIC BLOOD PRESSURE: 122 MMHG | HEART RATE: 94 BPM

## 2021-01-16 VITALS
OXYGEN SATURATION: 92 % | WEIGHT: 151.9 LBS | TEMPERATURE: 98 F | HEART RATE: 74 BPM | DIASTOLIC BLOOD PRESSURE: 72 MMHG | HEIGHT: 65 IN | RESPIRATION RATE: 20 BRPM | SYSTOLIC BLOOD PRESSURE: 110 MMHG

## 2021-01-16 VITALS
RESPIRATION RATE: 18 BRPM | SYSTOLIC BLOOD PRESSURE: 124 MMHG | HEART RATE: 74 BPM | DIASTOLIC BLOOD PRESSURE: 71 MMHG | OXYGEN SATURATION: 97 % | TEMPERATURE: 99 F

## 2021-01-16 DIAGNOSIS — Z90.710 ACQUIRED ABSENCE OF BOTH CERVIX AND UTERUS: Chronic | ICD-10-CM

## 2021-01-16 DIAGNOSIS — Z90.49 ACQUIRED ABSENCE OF OTHER SPECIFIED PARTS OF DIGESTIVE TRACT: Chronic | ICD-10-CM

## 2021-01-16 DIAGNOSIS — E78.5 HYPERLIPIDEMIA, UNSPECIFIED: ICD-10-CM

## 2021-01-16 DIAGNOSIS — I10 ESSENTIAL (PRIMARY) HYPERTENSION: ICD-10-CM

## 2021-01-16 DIAGNOSIS — E11.9 TYPE 2 DIABETES MELLITUS WITHOUT COMPLICATIONS: ICD-10-CM

## 2021-01-16 DIAGNOSIS — K21.9 GASTRO-ESOPHAGEAL REFLUX DISEASE WITHOUT ESOPHAGITIS: ICD-10-CM

## 2021-01-16 DIAGNOSIS — M12.9 ARTHROPATHY, UNSPECIFIED: ICD-10-CM

## 2021-01-16 DIAGNOSIS — U07.1 COVID-19: ICD-10-CM

## 2021-01-16 DIAGNOSIS — J96.00 ACUTE RESPIRATORY FAILURE, UNSPECIFIED WHETHER WITH HYPOXIA OR HYPERCAPNIA: ICD-10-CM

## 2021-01-16 LAB
ALBUMIN SERPL ELPH-MCNC: 3.7 G/DL — SIGNIFICANT CHANGE UP (ref 3.3–5)
ALP SERPL-CCNC: 64 U/L — SIGNIFICANT CHANGE UP (ref 40–120)
ALT FLD-CCNC: 26 U/L — SIGNIFICANT CHANGE UP (ref 10–45)
ANION GAP SERPL CALC-SCNC: 13 MMOL/L — SIGNIFICANT CHANGE UP (ref 5–17)
AST SERPL-CCNC: 27 U/L — SIGNIFICANT CHANGE UP (ref 10–40)
BASOPHILS # BLD AUTO: 0 K/UL — SIGNIFICANT CHANGE UP (ref 0–0.2)
BASOPHILS NFR BLD AUTO: 0 % — SIGNIFICANT CHANGE UP (ref 0–2)
BILIRUB SERPL-MCNC: 0.9 MG/DL — SIGNIFICANT CHANGE UP (ref 0.2–1.2)
BUN SERPL-MCNC: 12 MG/DL — SIGNIFICANT CHANGE UP (ref 7–23)
CALCIUM SERPL-MCNC: 9.1 MG/DL — SIGNIFICANT CHANGE UP (ref 8.4–10.5)
CHLORIDE SERPL-SCNC: 100 MMOL/L — SIGNIFICANT CHANGE UP (ref 96–108)
CO2 SERPL-SCNC: 22 MMOL/L — SIGNIFICANT CHANGE UP (ref 22–31)
CREAT SERPL-MCNC: 0.91 MG/DL — SIGNIFICANT CHANGE UP (ref 0.5–1.3)
CRP SERPL-MCNC: 7.21 MG/DL — HIGH (ref 0–0.4)
D DIMER BLD IA.RAPID-MCNC: 232 NG/ML DDU — HIGH
EOSINOPHIL # BLD AUTO: 0.03 K/UL — SIGNIFICANT CHANGE UP (ref 0–0.5)
EOSINOPHIL NFR BLD AUTO: 0.9 % — SIGNIFICANT CHANGE UP (ref 0–6)
FERRITIN SERPL-MCNC: 419 NG/ML — HIGH (ref 15–150)
GLUCOSE SERPL-MCNC: 163 MG/DL — HIGH (ref 70–99)
HCT VFR BLD CALC: 40.2 % — SIGNIFICANT CHANGE UP (ref 34.5–45)
HGB BLD-MCNC: 13.6 G/DL — SIGNIFICANT CHANGE UP (ref 11.5–15.5)
HIV 1 & 2 AB SERPL IA.RAPID: SIGNIFICANT CHANGE UP
LYMPHOCYTES # BLD AUTO: 0.45 K/UL — LOW (ref 1–3.3)
LYMPHOCYTES # BLD AUTO: 14.3 % — SIGNIFICANT CHANGE UP (ref 13–44)
MCHC RBC-ENTMCNC: 30.9 PG — SIGNIFICANT CHANGE UP (ref 27–34)
MCHC RBC-ENTMCNC: 33.8 GM/DL — SIGNIFICANT CHANGE UP (ref 32–36)
MCV RBC AUTO: 91.4 FL — SIGNIFICANT CHANGE UP (ref 80–100)
MONOCYTES # BLD AUTO: 0.2 K/UL — SIGNIFICANT CHANGE UP (ref 0–0.9)
MONOCYTES NFR BLD AUTO: 6.3 % — SIGNIFICANT CHANGE UP (ref 2–14)
NEUTROPHILS # BLD AUTO: 2.48 K/UL — SIGNIFICANT CHANGE UP (ref 1.8–7.4)
NEUTROPHILS NFR BLD AUTO: 70.5 % — SIGNIFICANT CHANGE UP (ref 43–77)
PLATELET # BLD AUTO: 131 K/UL — LOW (ref 150–400)
POTASSIUM SERPL-MCNC: 4.7 MMOL/L — SIGNIFICANT CHANGE UP (ref 3.5–5.3)
POTASSIUM SERPL-SCNC: 4.7 MMOL/L — SIGNIFICANT CHANGE UP (ref 3.5–5.3)
PROCALCITONIN SERPL-MCNC: 0.07 NG/ML — SIGNIFICANT CHANGE UP (ref 0.02–0.1)
PROT SERPL-MCNC: 6.4 G/DL — SIGNIFICANT CHANGE UP (ref 6–8.3)
RBC # BLD: 4.4 M/UL — SIGNIFICANT CHANGE UP (ref 3.8–5.2)
RBC # FLD: 12.4 % — SIGNIFICANT CHANGE UP (ref 10.3–14.5)
SARS-COV-2 RNA SPEC QL NAA+PROBE: DETECTED
SODIUM SERPL-SCNC: 135 MMOL/L — SIGNIFICANT CHANGE UP (ref 135–145)
WBC # BLD: 3.16 K/UL — LOW (ref 3.8–10.5)
WBC # FLD AUTO: 3.16 K/UL — LOW (ref 3.8–10.5)

## 2021-01-16 PROCEDURE — 85025 COMPLETE CBC W/AUTO DIFF WBC: CPT

## 2021-01-16 PROCEDURE — 71045 X-RAY EXAM CHEST 1 VIEW: CPT

## 2021-01-16 PROCEDURE — U0005: CPT

## 2021-01-16 PROCEDURE — 99223 1ST HOSP IP/OBS HIGH 75: CPT | Mod: AI,GC

## 2021-01-16 PROCEDURE — 99285 EMERGENCY DEPT VISIT HI MDM: CPT | Mod: CS,GC

## 2021-01-16 PROCEDURE — 85379 FIBRIN DEGRADATION QUANT: CPT

## 2021-01-16 PROCEDURE — U0003: CPT

## 2021-01-16 PROCEDURE — 99285 EMERGENCY DEPT VISIT HI MDM: CPT | Mod: 25

## 2021-01-16 PROCEDURE — 71045 X-RAY EXAM CHEST 1 VIEW: CPT | Mod: 26

## 2021-01-16 PROCEDURE — 86140 C-REACTIVE PROTEIN: CPT

## 2021-01-16 PROCEDURE — 80053 COMPREHEN METABOLIC PANEL: CPT

## 2021-01-16 PROCEDURE — 82728 ASSAY OF FERRITIN: CPT

## 2021-01-16 PROCEDURE — 86703 HIV-1/HIV-2 1 RESULT ANTBDY: CPT

## 2021-01-16 PROCEDURE — 93005 ELECTROCARDIOGRAM TRACING: CPT

## 2021-01-16 PROCEDURE — 96374 THER/PROPH/DIAG INJ IV PUSH: CPT

## 2021-01-16 PROCEDURE — 84145 PROCALCITONIN (PCT): CPT

## 2021-01-16 RX ORDER — AMLODIPINE BESYLATE 2.5 MG/1
10 TABLET ORAL DAILY
Refills: 0 | Status: DISCONTINUED | OUTPATIENT
Start: 2021-01-16 | End: 2021-01-19

## 2021-01-16 RX ORDER — DEXTROSE 50 % IN WATER 50 %
12.5 SYRINGE (ML) INTRAVENOUS ONCE
Refills: 0 | Status: DISCONTINUED | OUTPATIENT
Start: 2021-01-16 | End: 2021-01-19

## 2021-01-16 RX ORDER — LOSARTAN POTASSIUM 100 MG/1
0 TABLET, FILM COATED ORAL
Qty: 0 | Refills: 0 | DISCHARGE

## 2021-01-16 RX ORDER — REMDESIVIR 5 MG/ML
200 INJECTION INTRAVENOUS EVERY 24 HOURS
Refills: 0 | Status: COMPLETED | OUTPATIENT
Start: 2021-01-16 | End: 2021-01-16

## 2021-01-16 RX ORDER — LOSARTAN POTASSIUM 100 MG/1
50 TABLET, FILM COATED ORAL DAILY
Refills: 0 | Status: DISCONTINUED | OUTPATIENT
Start: 2021-01-16 | End: 2021-01-19

## 2021-01-16 RX ORDER — MELOXICAM 15 MG/1
1 TABLET ORAL
Qty: 0 | Refills: 0 | DISCHARGE

## 2021-01-16 RX ORDER — ASPIRIN/CALCIUM CARB/MAGNESIUM 324 MG
81 TABLET ORAL DAILY
Refills: 0 | Status: DISCONTINUED | OUTPATIENT
Start: 2021-01-16 | End: 2021-01-19

## 2021-01-16 RX ORDER — GUAIFENESIN/DEXTROMETHORPHAN 600MG-30MG
10 TABLET, EXTENDED RELEASE 12 HR ORAL EVERY 4 HOURS
Refills: 0 | Status: DISCONTINUED | OUTPATIENT
Start: 2021-01-16 | End: 2021-01-19

## 2021-01-16 RX ORDER — ASPIRIN/CALCIUM CARB/MAGNESIUM 324 MG
0 TABLET ORAL
Qty: 0 | Refills: 0 | DISCHARGE

## 2021-01-16 RX ORDER — GABAPENTIN 400 MG/1
0 CAPSULE ORAL
Qty: 0 | Refills: 0 | DISCHARGE

## 2021-01-16 RX ORDER — ENOXAPARIN SODIUM 100 MG/ML
40 INJECTION SUBCUTANEOUS EVERY 24 HOURS
Refills: 0 | Status: DISCONTINUED | OUTPATIENT
Start: 2021-01-16 | End: 2021-01-19

## 2021-01-16 RX ORDER — ACETAMINOPHEN 500 MG
650 TABLET ORAL EVERY 4 HOURS
Refills: 0 | Status: DISCONTINUED | OUTPATIENT
Start: 2021-01-16 | End: 2021-01-19

## 2021-01-16 RX ORDER — BISOPROLOL FUMARATE 10 MG/1
1 TABLET, FILM COATED ORAL
Qty: 0 | Refills: 0 | DISCHARGE

## 2021-01-16 RX ORDER — AMLODIPINE BESYLATE 2.5 MG/1
0 TABLET ORAL
Qty: 0 | Refills: 0 | DISCHARGE

## 2021-01-16 RX ORDER — DEXAMETHASONE 0.5 MG/5ML
6 ELIXIR ORAL ONCE
Refills: 0 | Status: COMPLETED | OUTPATIENT
Start: 2021-01-16 | End: 2021-01-16

## 2021-01-16 RX ORDER — GLUCAGON INJECTION, SOLUTION 0.5 MG/.1ML
1 INJECTION, SOLUTION SUBCUTANEOUS ONCE
Refills: 0 | Status: DISCONTINUED | OUTPATIENT
Start: 2021-01-16 | End: 2021-01-19

## 2021-01-16 RX ORDER — INSULIN LISPRO 100/ML
VIAL (ML) SUBCUTANEOUS
Refills: 0 | Status: DISCONTINUED | OUTPATIENT
Start: 2021-01-16 | End: 2021-01-19

## 2021-01-16 RX ORDER — REMDESIVIR 5 MG/ML
100 INJECTION INTRAVENOUS EVERY 24 HOURS
Refills: 0 | Status: DISCONTINUED | OUTPATIENT
Start: 2021-01-17 | End: 2021-01-19

## 2021-01-16 RX ORDER — DEXTROSE 50 % IN WATER 50 %
25 SYRINGE (ML) INTRAVENOUS ONCE
Refills: 0 | Status: DISCONTINUED | OUTPATIENT
Start: 2021-01-16 | End: 2021-01-19

## 2021-01-16 RX ORDER — SODIUM CHLORIDE 9 MG/ML
1000 INJECTION, SOLUTION INTRAVENOUS
Refills: 0 | Status: DISCONTINUED | OUTPATIENT
Start: 2021-01-16 | End: 2021-01-19

## 2021-01-16 RX ORDER — ATORVASTATIN CALCIUM 80 MG/1
0 TABLET, FILM COATED ORAL
Qty: 0 | Refills: 0 | DISCHARGE

## 2021-01-16 RX ORDER — DEXAMETHASONE 0.5 MG/5ML
6 ELIXIR ORAL DAILY
Refills: 0 | Status: DISCONTINUED | OUTPATIENT
Start: 2021-01-16 | End: 2021-01-19

## 2021-01-16 RX ORDER — DEXTROSE 50 % IN WATER 50 %
15 SYRINGE (ML) INTRAVENOUS ONCE
Refills: 0 | Status: DISCONTINUED | OUTPATIENT
Start: 2021-01-16 | End: 2021-01-19

## 2021-01-16 RX ORDER — FOLIC ACID 0.8 MG
0 TABLET ORAL
Qty: 0 | Refills: 0 | DISCHARGE

## 2021-01-16 RX ORDER — ATORVASTATIN CALCIUM 80 MG/1
10 TABLET, FILM COATED ORAL AT BEDTIME
Refills: 0 | Status: DISCONTINUED | OUTPATIENT
Start: 2021-01-16 | End: 2021-01-19

## 2021-01-16 RX ORDER — PANTOPRAZOLE SODIUM 20 MG/1
40 TABLET, DELAYED RELEASE ORAL
Refills: 0 | Status: DISCONTINUED | OUTPATIENT
Start: 2021-01-16 | End: 2021-01-19

## 2021-01-16 RX ORDER — REMDESIVIR 5 MG/ML
INJECTION INTRAVENOUS
Refills: 0 | Status: DISCONTINUED | OUTPATIENT
Start: 2021-01-16 | End: 2021-01-19

## 2021-01-16 RX ADMIN — Medication 6 MILLIGRAM(S): at 14:03

## 2021-01-16 RX ADMIN — ENOXAPARIN SODIUM 40 MILLIGRAM(S): 100 INJECTION SUBCUTANEOUS at 22:27

## 2021-01-16 RX ADMIN — REMDESIVIR 500 MILLIGRAM(S): 5 INJECTION INTRAVENOUS at 22:27

## 2021-01-16 RX ADMIN — ATORVASTATIN CALCIUM 10 MILLIGRAM(S): 80 TABLET, FILM COATED ORAL at 22:27

## 2021-01-16 NOTE — H&P ADULT - HISTORY OF PRESENT ILLNESS
85yr F since 6 days with low grade fever subjective, cough& worsened since 2 days accompanied by malaise, loss of appetite. O2 initially 95-97% ,this am 89% and Daughter found she was pos on 1/6.   Tele visit 1/15: Z pack started yesterday  Yearly Flu&Pneumonia infections, did not receive the Flu shot this yr.   PMHX: DM,HTN,HLD, CVA-4 mths ago (confused, no speech or limb paralysis at [facility]). No known HD. Prescribed ASA after stroke  NKDA. NFA.  No insulin injection  Surgery([place]):  lap vanda 30yrs ago, Hysterectomy 1995  Independent, functional, memory  Social: no alc /smoke  FHx:  mother- HTN,CVA    85yr F since 6 days with low grade fever subjective, cough& worsened since 2 days accompanied by malaise, loss of appetite. O2 initially 95-97% ,this am 89% when Daughter was concerned and called EMS. Pt is Independent&functional, no memory impairment at baseline  Positive contacts at home: daughter& grandson with similar sx, daughter found she was positive on 1/6.   Pt had a Tele visit 1/15: Z pack started yesterday  Hx of yearly Flu&Pneumonia infections, did not receive the Flu shot this yr. No hx of intubation  PMHX: DM,HTN,HLD, CVA/TIA-4 mths ago with full resolution in neurological sx in 3days (confused, no speech or limb paralysis at [facility]). No known CAD Prescribed ASA 325mg after stroke, pt has instead been taking ASA 81mg daily  NKDA. NFA.  Not on  insulin injection  Surgery([place]):  lap vanda 30yrs ago, Hysterectomy 1995    Social: no alc /smoke  FHx:  mother- HTN,CVA

## 2021-01-16 NOTE — H&P ADULT - ASSESSMENT
IMPROVE VTE Individual Risk Assessment    RISK                                                                Points    [  ] Previous VTE                                                  3    [  ] Thrombophilia                                               2    [  ] Lower limb paralysis                                      2        (unable to hold up >15 seconds)      [  ] Current Cancer                                              2         (within 6 months)    [ x ] Immobilization > 24 hrs                                1    [  ] ICU/CCU stay > 24 hours                              1    [ x ] Age > 60                                                      1    IMPROVE VTE Score ______2___    IMPROVE Score 0-1: Low Risk, No VTE prophylaxis required for most patients, encourage ambulation.   IMPROVE Score 2-3: At risk, pharmacologic VTE prophylaxis is indicated for most patients (in the absence of a contraindication)  IMPROVE Score > or = 4: High Risk, pharmacologic VTE prophylaxis is indicated for most patients (in the absence of a contraindication)

## 2021-01-16 NOTE — ED PROVIDER NOTE - PROGRESS NOTE DETAILS
Attending MD Delvalle: This patient meets criteria for severe covid-19 illness and requires hospitalization. In accordance with the current institutional initiative for covid hospitalization load balancing, this patient will be transferred to be admitted to one of our sister facilities for ongoing treatment. The patient is stable and appropriate for transfer and was informed. Patient to be transferred to [place] ED with subsequent admission to floor accepting Dr. Cano

## 2021-01-16 NOTE — H&P ADULT - NSHPPHYSICALEXAM_GEN_ALL_CORE
PHYSICAL EXAM:  Vital Signs Last 24 Hrs  T(C): 37 (16 Jan 2021 17:29), Max: 37.2 (16 Jan 2021 16:11)  T(F): 98.6 (16 Jan 2021 17:29), Max: 98.9 (16 Jan 2021 16:11)  HR: 94 (16 Jan 2021 17:29) (74 - 94)  BP: 122/77 (16 Jan 2021 17:29) (110/72 - 124/71)  BP(mean): --  RR: 16 (16 Jan 2021 17:29) (16 - 20)  SpO2: 97% (16 Jan 2021 17:29) (92% - 97%)    GENERAL: NAD, well-groomed, well-developed  HEAD:  Atraumatic, Normocephalic  EYES: EOMI, PERRLA, conjunctiva and sclera clear  ENMT: No tonsillar erythema, exudates, or enlargement; Moist mucous membranes, Good dentition, No lesions  NECK: Supple, No JVD, Normal thyroid  NERVOUS SYSTEM:  Alert & Oriented X3, Good concentration; Motor Strength 5/5 B/L upper and lower extremities;  CHEST/LUNG: Clear to auscultation bilaterally; diffuse rales both lung fields, no  rhonchi/ wheezing/ rubs  HEART: Regular rate and rhythm; No murmurs, rubs, or gallops  ABDOMEN: Soft, Nontender, Nondistended; Bowel sounds present  EXTREMITIES:  2+ Peripheral Pulses, No clubbing, cyanosis, or edema  LYMPH: No lymphadenopathy noted  SKIN: No rashes or lesions

## 2021-01-16 NOTE — ED PROVIDER NOTE - PHYSICAL EXAMINATION
General: well appearing, no acute distress, AOx3  Skin: normal color for race, no rash  Head: normocephalic, atraumatic  Eyes: clear conjunctiva, EOMI  ENMT: airway patent, no nasal discharge  Cardiovascular: normal rate, normal rhythm, S1/S2  Pulmonary: diffuse crackles, on 2L NC O2 sat 97%  Abdomen: soft, nontender  Musculoskeletal: moving extremities well, no deformity  Psych: normal mood, normal affect

## 2021-01-16 NOTE — ED PROVIDER NOTE - ATTENDING CONTRIBUTION TO CARE
Attending MD Delvalle:  I personally have seen and examined this patient.  Resident note reviewed and agree on plan of care and except where noted.  See HPI, PE, and MDM for details.

## 2021-01-16 NOTE — H&P ADULT - NSHPOUTPATIENTPROVIDERS_GEN_ALL_CORE
#  (C)  Dr. Lepe Cardiology # [phone] (last visit 6mths ago)  Dr. Calix Neurology #[phone] ( last visit 4 mths ago)

## 2021-01-16 NOTE — ED PROVIDER NOTE - OBJECTIVE STATEMENT
85 year old female with a pmhx of DM, HTN, GERD, presents to ED for evaluation of generalized malaise and weakness. Patient reports started to not feel well 2 weeks ago. She notes today having O2 sat 92% on home pulse-ox and feeling short of breath. She is on day 2 of Azithromycin. She has not yet been checked for COVID-19, but close contact she believes tested positive 2 days ago. Patient reports cough that is improving. She denies any fever, chest pain, abd pain, vomiting, diarrhea, dysuria. No hx of PE/DVT. No tobacco use.

## 2021-01-16 NOTE — H&P ADULT - PROBLEM SELECTOR PLAN 1
cxr reviewed - viral pna  D dimer normal  Crp and Procalcitonin reviewed  COVID-19--critical period for decompensation  (7-14 days post symptom onset), avoid aerosolizing procedures, NSAIDs   DVT p with Lovenox   tylenol and robitussin &benzonatate PRN for sx management  Start Decadron 6mg iv daily for Covid 19 with Hypoxemia (1st dose received in ED)  Start on remdesivir 200mg IV once followed by 100mg IV Qd x4 to complete 5 days (meets criteria)  - monitor respiratory status closely ( route of 02 and saturation)Fio2 and o2 support - titration - keep sat > 88 pct  assist with needs and ADL as needed.  goals of care d/w daughter: DNR/DNI  MOLST completed with verbal consent as per patient's wishes as told to daughter who is agreeable  monitor biomarkers- CBC w diff  for NLR <3 low vs >5 high) Ferritin (lower risk <450 vs >850) CRP (low risk <2 and higher risk >6) and LDH, D Dimer, procalcitonin  - trend temp & CBC  -continue supportive care  -therapies remains investigational  -isolation precautions per protocol

## 2021-01-16 NOTE — ED PROVIDER NOTE - CLINICAL SUMMARY MEDICAL DECISION MAKING FREE TEXT BOX
Attending MD Delvalle:   85F presenting with complaint of cough and low O2 sat at home to high 80s, reportedly covid positive exposure in her home. Patient on exam in no apparent distress but O2 sat 92%. Concern for covid-19 pneumonia. Plan for labs, CXR, COVID-PCR admission given hypoxic resp failure       *The above represents an initial assessment/impression. Please refer to progress notes for potential changes in patient clinical course*

## 2021-01-16 NOTE — H&P ADULT - PROBLEM SELECTOR PLAN 2
AIC unk, stable glycmeic control as per daughter  HOLD OHA's in ac setting  -ISS & hypoglycemic precautions as per protocol  - FSG POC  - obtain AIC

## 2021-01-16 NOTE — H&P ADULT - NSHPREVIEWOFSYSTEMS_GEN_ALL_CORE
REVIEW OF SYSTEMS:  CONSTITUTIONAL: + subj fever & malaise x1wk, weight loss  EYES: No eye pain, visual disturbances, or discharge  ENMT:  No difficulty hearing, tinnitus, vertigo; No sinus or throat pain  NECK: No pain or stiffness  BREASTS: No pain, masses, or nipple discharge  RESPIRATORY:  +cough x5days, + shortness of breath x2dys, no wheezing, chills or hemoptysis;   CARDIOVASCULAR: No chest pain, palpitations, dizziness, or leg swelling  GASTROINTESTINAL: No abdominal or epigastric pain. No nausea, vomiting, or hematemesis; No diarrhea or constipation. No melena or hematochezia. + dec [id number]  GENITOURINARY: No dysuria, frequency, hematuria, or incontinence  NEUROLOGICAL: No headaches, memory loss, loss of strength, numbness, or tremors  SKIN: No itching, burning, rashes, or lesions   LYMPH NODES: No enlarged glands  ENDOCRINE: No heat or cold intolerance; No hair loss; No polydipsia or polyuria  MUSCULOSKELETAL: No joint pain or swelling; No muscle, back, or extremity pain  PSYCHIATRIC: No depression, anxiety, mood swings, or difficulty sleeping  HEME/LYMPH: No easy bruising, or bleeding gums  ALLERGY AND IMMUNOLOGIC: No hives or eczema

## 2021-01-16 NOTE — ED PROVIDER NOTE - NS ED ROS FT
General: no fever, no chills, +weakness, malaise   Eyes: no vision changes, no eye pain  Cardiovascular: no chest pain, no edema  Respiratory: +sob, cough   Gastrointestinal: no abdominal pain, no nausea, no vomiting, no diarrhea  Genitourinary: no dysuria, no hematuria  Musculoskeletal: no muscle pain, no joint pain  Skin: no rash, no lesions  Neuro: no numbness, no tingling  Psych: no depression, no anxiety

## 2021-01-16 NOTE — ED ADULT NURSE REASSESSMENT NOTE - NS ED NURSE REASSESS COMMENT FT1
[facility] contacted, patient sweater left here in the ER. Message to be relayed to patient, for possible pick-up of sweater by family.

## 2021-01-17 DIAGNOSIS — D72.819 DECREASED WHITE BLOOD CELL COUNT, UNSPECIFIED: ICD-10-CM

## 2021-01-17 DIAGNOSIS — Z29.9 ENCOUNTER FOR PROPHYLACTIC MEASURES, UNSPECIFIED: ICD-10-CM

## 2021-01-17 LAB
A1C WITH ESTIMATED AVERAGE GLUCOSE RESULT: 7.7 % — HIGH (ref 4–5.6)
ALBUMIN SERPL ELPH-MCNC: 3.1 G/DL — LOW (ref 3.3–5)
ALP SERPL-CCNC: 59 U/L — SIGNIFICANT CHANGE UP (ref 40–120)
ALP SERPL-CCNC: 62 U/L — SIGNIFICANT CHANGE UP (ref 40–120)
ALP SERPL-CCNC: 63 U/L — SIGNIFICANT CHANGE UP (ref 40–120)
ALT FLD-CCNC: 28 U/L — SIGNIFICANT CHANGE UP (ref 12–78)
ALT FLD-CCNC: 29 U/L — SIGNIFICANT CHANGE UP (ref 12–78)
ALT FLD-CCNC: 30 U/L — SIGNIFICANT CHANGE UP (ref 12–78)
ANION GAP SERPL CALC-SCNC: 11 MMOL/L — SIGNIFICANT CHANGE UP (ref 5–17)
APPEARANCE UR: ABNORMAL
AST SERPL-CCNC: 23 U/L — SIGNIFICANT CHANGE UP (ref 15–37)
AST SERPL-CCNC: 24 U/L — SIGNIFICANT CHANGE UP (ref 15–37)
AST SERPL-CCNC: 26 U/L — SIGNIFICANT CHANGE UP (ref 15–37)
BILIRUB DIRECT SERPL-MCNC: 0.2 MG/DL — SIGNIFICANT CHANGE UP (ref 0.05–0.2)
BILIRUB DIRECT SERPL-MCNC: 0.3 MG/DL — HIGH (ref 0.05–0.2)
BILIRUB INDIRECT FLD-MCNC: 0.5 MG/DL — SIGNIFICANT CHANGE UP (ref 0.2–1)
BILIRUB INDIRECT FLD-MCNC: 0.5 MG/DL — SIGNIFICANT CHANGE UP (ref 0.2–1)
BILIRUB SERPL-MCNC: 0.7 MG/DL — SIGNIFICANT CHANGE UP (ref 0.2–1.2)
BILIRUB SERPL-MCNC: 0.7 MG/DL — SIGNIFICANT CHANGE UP (ref 0.2–1.2)
BILIRUB SERPL-MCNC: 0.8 MG/DL — SIGNIFICANT CHANGE UP (ref 0.2–1.2)
BILIRUB UR-MCNC: NEGATIVE — SIGNIFICANT CHANGE UP
BUN SERPL-MCNC: 15 MG/DL — SIGNIFICANT CHANGE UP (ref 7–23)
CALCIUM SERPL-MCNC: 8.6 MG/DL — SIGNIFICANT CHANGE UP (ref 8.5–10.1)
CHLORIDE SERPL-SCNC: 105 MMOL/L — SIGNIFICANT CHANGE UP (ref 96–108)
CHOLEST SERPL-MCNC: 140 MG/DL — SIGNIFICANT CHANGE UP
CO2 SERPL-SCNC: 23 MMOL/L — SIGNIFICANT CHANGE UP (ref 22–31)
COLOR SPEC: YELLOW — SIGNIFICANT CHANGE UP
CREAT SERPL-MCNC: 0.9 MG/DL — SIGNIFICANT CHANGE UP (ref 0.5–1.3)
CREAT SERPL-MCNC: 0.98 MG/DL — SIGNIFICANT CHANGE UP (ref 0.5–1.3)
CRP SERPL-MCNC: 6.98 MG/DL — HIGH (ref 0–0.4)
DIFF PNL FLD: NEGATIVE — SIGNIFICANT CHANGE UP
ESTIMATED AVERAGE GLUCOSE: 174 MG/DL — HIGH (ref 68–114)
GLUCOSE SERPL-MCNC: 248 MG/DL — HIGH (ref 70–99)
GLUCOSE UR QL: 250
HCT VFR BLD CALC: 40.1 % — SIGNIFICANT CHANGE UP (ref 34.5–45)
HCT VFR BLD CALC: 41.4 % — SIGNIFICANT CHANGE UP (ref 34.5–45)
HDLC SERPL-MCNC: 43 MG/DL — LOW
HGB BLD-MCNC: 13.6 G/DL — SIGNIFICANT CHANGE UP (ref 11.5–15.5)
HGB BLD-MCNC: 13.8 G/DL — SIGNIFICANT CHANGE UP (ref 11.5–15.5)
KETONES UR-MCNC: ABNORMAL
LDH SERPL L TO P-CCNC: 230 U/L — SIGNIFICANT CHANGE UP (ref 50–242)
LEUKOCYTE ESTERASE UR-ACNC: NEGATIVE — SIGNIFICANT CHANGE UP
LIPID PNL WITH DIRECT LDL SERPL: 72 MG/DL — SIGNIFICANT CHANGE UP
MCHC RBC-ENTMCNC: 30 PG — SIGNIFICANT CHANGE UP (ref 27–34)
MCHC RBC-ENTMCNC: 30 PG — SIGNIFICANT CHANGE UP (ref 27–34)
MCHC RBC-ENTMCNC: 33.3 GM/DL — SIGNIFICANT CHANGE UP (ref 32–36)
MCHC RBC-ENTMCNC: 33.9 GM/DL — SIGNIFICANT CHANGE UP (ref 32–36)
MCV RBC AUTO: 88.5 FL — SIGNIFICANT CHANGE UP (ref 80–100)
MCV RBC AUTO: 90 FL — SIGNIFICANT CHANGE UP (ref 80–100)
NITRITE UR-MCNC: NEGATIVE — SIGNIFICANT CHANGE UP
NON HDL CHOLESTEROL: 97 MG/DL — SIGNIFICANT CHANGE UP
NRBC # BLD: 0 /100 WBCS — SIGNIFICANT CHANGE UP (ref 0–0)
NRBC # BLD: 0 /100 WBCS — SIGNIFICANT CHANGE UP (ref 0–0)
PH UR: 5 — SIGNIFICANT CHANGE UP (ref 5–8)
PLATELET # BLD AUTO: 145 K/UL — LOW (ref 150–400)
PLATELET # BLD AUTO: 151 K/UL — SIGNIFICANT CHANGE UP (ref 150–400)
POTASSIUM SERPL-MCNC: 4.1 MMOL/L — SIGNIFICANT CHANGE UP (ref 3.5–5.3)
POTASSIUM SERPL-SCNC: 4.1 MMOL/L — SIGNIFICANT CHANGE UP (ref 3.5–5.3)
PROT SERPL-MCNC: 6.9 G/DL — SIGNIFICANT CHANGE UP (ref 6–8.3)
PROT SERPL-MCNC: 6.9 G/DL — SIGNIFICANT CHANGE UP (ref 6–8.3)
PROT SERPL-MCNC: 7 G/DL — SIGNIFICANT CHANGE UP (ref 6–8.3)
PROT UR-MCNC: 30 MG/DL
RBC # BLD: 4.53 M/UL — SIGNIFICANT CHANGE UP (ref 3.8–5.2)
RBC # BLD: 4.6 M/UL — SIGNIFICANT CHANGE UP (ref 3.8–5.2)
RBC # FLD: 12.2 % — SIGNIFICANT CHANGE UP (ref 10.3–14.5)
RBC # FLD: 12.2 % — SIGNIFICANT CHANGE UP (ref 10.3–14.5)
SARS-COV-2 IGG SERPL QL IA: NEGATIVE — SIGNIFICANT CHANGE UP
SARS-COV-2 IGM SERPL IA-ACNC: 0.14 INDEX — SIGNIFICANT CHANGE UP
SODIUM SERPL-SCNC: 139 MMOL/L — SIGNIFICANT CHANGE UP (ref 135–145)
SP GR SPEC: 1.02 — SIGNIFICANT CHANGE UP (ref 1.01–1.02)
TRIGL SERPL-MCNC: 127 MG/DL — SIGNIFICANT CHANGE UP
TSH SERPL-MCNC: 0.52 UIU/ML — SIGNIFICANT CHANGE UP (ref 0.36–3.74)
UROBILINOGEN FLD QL: NEGATIVE — SIGNIFICANT CHANGE UP
WBC # BLD: 1.55 K/UL — LOW (ref 3.8–10.5)
WBC # BLD: 2.29 K/UL — LOW (ref 3.8–10.5)
WBC # FLD AUTO: 1.55 K/UL — LOW (ref 3.8–10.5)
WBC # FLD AUTO: 2.29 K/UL — LOW (ref 3.8–10.5)

## 2021-01-17 PROCEDURE — 99233 SBSQ HOSP IP/OBS HIGH 50: CPT | Mod: CS

## 2021-01-17 RX ADMIN — ENOXAPARIN SODIUM 40 MILLIGRAM(S): 100 INJECTION SUBCUTANEOUS at 21:44

## 2021-01-17 RX ADMIN — Medication 3: at 08:38

## 2021-01-17 RX ADMIN — Medication 81 MILLIGRAM(S): at 10:29

## 2021-01-17 RX ADMIN — LOSARTAN POTASSIUM 50 MILLIGRAM(S): 100 TABLET, FILM COATED ORAL at 05:24

## 2021-01-17 RX ADMIN — Medication 3: at 17:03

## 2021-01-17 RX ADMIN — ATORVASTATIN CALCIUM 10 MILLIGRAM(S): 80 TABLET, FILM COATED ORAL at 21:44

## 2021-01-17 RX ADMIN — REMDESIVIR 500 MILLIGRAM(S): 5 INJECTION INTRAVENOUS at 21:44

## 2021-01-17 RX ADMIN — Medication 6 MILLIGRAM(S): at 05:24

## 2021-01-17 RX ADMIN — PANTOPRAZOLE SODIUM 40 MILLIGRAM(S): 20 TABLET, DELAYED RELEASE ORAL at 05:24

## 2021-01-17 RX ADMIN — Medication 100 MILLIGRAM(S): at 10:29

## 2021-01-17 RX ADMIN — Medication 3: at 12:29

## 2021-01-17 RX ADMIN — AMLODIPINE BESYLATE 10 MILLIGRAM(S): 2.5 TABLET ORAL at 05:24

## 2021-01-17 NOTE — CONSULT NOTE ADULT - ASSESSMENT
Leukopenia with differential suggestive of lymphopenia most likely secondary to Covid    Recommendations:  1.  follow CBC  2.  continue decadron and Remdesivir  3.  prognosis guarded  4.  agree with DNR.  continue current palliative care  5.  hold G-CSF and observe.   discussed with Dr. Lara

## 2021-01-17 NOTE — PROGRESS NOTE ADULT - PROBLEM SELECTOR PLAN 3
- RESUME HOME DOSE OF AMLODIPINE&LOSARTAN with hold parameters  - close cardiac monitoring AIC unk, stable glycmeic control as per daughter  HOLD OHA's in ac setting  -ISS & hypoglycemic precautions as per protocol  - FSG POC  - obtain AIC

## 2021-01-17 NOTE — PROGRESS NOTE ADULT - PROBLEM SELECTOR PROBLEM 2
Type 2 diabetes mellitus without complication, without long-term current use of insulin Leukopenia, unspecified type

## 2021-01-17 NOTE — PROGRESS NOTE ADULT - PROBLEM SELECTOR PLAN 2
AIC unk, stable glycmeic control as per daughter  HOLD OHA's in ac setting  -ISS & hypoglycemic precautions as per protocol  - FSG POC  - obtain AIC Suspect due to infection  repeat cbc  Hematology consulted Dr. Ballard   Monitor  counts

## 2021-01-17 NOTE — PROGRESS NOTE ADULT - PROBLEM SELECTOR PLAN 4
continue statin therapy  obtain lipid panel - RESUME HOME DOSE OF AMLODIPINE&LOSARTAN with hold parameters  - close cardiac monitoring

## 2021-01-17 NOTE — ED POST DISCHARGE NOTE - ADDITIONAL DOCUMENTATION
ferritin sent in the setting of +Covid test, ferritin level expected to be elevated, no change in management - Gillian Olguin PA-C

## 2021-01-18 LAB
ALBUMIN SERPL ELPH-MCNC: 2.9 G/DL — LOW (ref 3.3–5)
ALBUMIN SERPL ELPH-MCNC: 2.9 G/DL — LOW (ref 3.3–5)
ALP SERPL-CCNC: 52 U/L — SIGNIFICANT CHANGE UP (ref 40–120)
ALP SERPL-CCNC: 54 U/L — SIGNIFICANT CHANGE UP (ref 40–120)
ALT FLD-CCNC: 22 U/L — SIGNIFICANT CHANGE UP (ref 12–78)
ALT FLD-CCNC: 24 U/L — SIGNIFICANT CHANGE UP (ref 12–78)
ANION GAP SERPL CALC-SCNC: 9 MMOL/L — SIGNIFICANT CHANGE UP (ref 5–17)
AST SERPL-CCNC: 20 U/L — SIGNIFICANT CHANGE UP (ref 15–37)
AST SERPL-CCNC: 21 U/L — SIGNIFICANT CHANGE UP (ref 15–37)
BASOPHILS # BLD AUTO: 0 K/UL — SIGNIFICANT CHANGE UP (ref 0–0.2)
BASOPHILS NFR BLD AUTO: 0 % — SIGNIFICANT CHANGE UP (ref 0–2)
BILIRUB DIRECT SERPL-MCNC: 0.2 MG/DL — SIGNIFICANT CHANGE UP (ref 0.05–0.2)
BILIRUB INDIRECT FLD-MCNC: 0.4 MG/DL — SIGNIFICANT CHANGE UP (ref 0.2–1)
BILIRUB SERPL-MCNC: 0.6 MG/DL — SIGNIFICANT CHANGE UP (ref 0.2–1.2)
BILIRUB SERPL-MCNC: 0.6 MG/DL — SIGNIFICANT CHANGE UP (ref 0.2–1.2)
BUN SERPL-MCNC: 20 MG/DL — SIGNIFICANT CHANGE UP (ref 7–23)
CALCIUM SERPL-MCNC: 8.5 MG/DL — SIGNIFICANT CHANGE UP (ref 8.5–10.1)
CHLORIDE SERPL-SCNC: 106 MMOL/L — SIGNIFICANT CHANGE UP (ref 96–108)
CO2 SERPL-SCNC: 22 MMOL/L — SIGNIFICANT CHANGE UP (ref 22–31)
CREAT SERPL-MCNC: 0.97 MG/DL — SIGNIFICANT CHANGE UP (ref 0.5–1.3)
D DIMER BLD IA.RAPID-MCNC: 159 NG/ML DDU — SIGNIFICANT CHANGE UP
EOSINOPHIL # BLD AUTO: 0 K/UL — SIGNIFICANT CHANGE UP (ref 0–0.5)
EOSINOPHIL NFR BLD AUTO: 0 % — SIGNIFICANT CHANGE UP (ref 0–6)
GLUCOSE SERPL-MCNC: 212 MG/DL — HIGH (ref 70–99)
HCT VFR BLD CALC: 37.2 % — SIGNIFICANT CHANGE UP (ref 34.5–45)
HGB BLD-MCNC: 12.6 G/DL — SIGNIFICANT CHANGE UP (ref 11.5–15.5)
IMM GRANULOCYTES NFR BLD AUTO: 0.5 % — SIGNIFICANT CHANGE UP (ref 0–1.5)
LYMPHOCYTES # BLD AUTO: 0.9 K/UL — LOW (ref 1–3.3)
LYMPHOCYTES # BLD AUTO: 20.8 % — SIGNIFICANT CHANGE UP (ref 13–44)
MCHC RBC-ENTMCNC: 30.1 PG — SIGNIFICANT CHANGE UP (ref 27–34)
MCHC RBC-ENTMCNC: 33.9 GM/DL — SIGNIFICANT CHANGE UP (ref 32–36)
MCV RBC AUTO: 88.8 FL — SIGNIFICANT CHANGE UP (ref 80–100)
MONOCYTES # BLD AUTO: 0.45 K/UL — SIGNIFICANT CHANGE UP (ref 0–0.9)
MONOCYTES NFR BLD AUTO: 10.4 % — SIGNIFICANT CHANGE UP (ref 2–14)
NEUTROPHILS # BLD AUTO: 2.96 K/UL — SIGNIFICANT CHANGE UP (ref 1.8–7.4)
NEUTROPHILS NFR BLD AUTO: 68.3 % — SIGNIFICANT CHANGE UP (ref 43–77)
NRBC # BLD: 0 /100 WBCS — SIGNIFICANT CHANGE UP (ref 0–0)
PLATELET # BLD AUTO: 166 K/UL — SIGNIFICANT CHANGE UP (ref 150–400)
POTASSIUM SERPL-MCNC: 4.2 MMOL/L — SIGNIFICANT CHANGE UP (ref 3.5–5.3)
POTASSIUM SERPL-SCNC: 4.2 MMOL/L — SIGNIFICANT CHANGE UP (ref 3.5–5.3)
PROT SERPL-MCNC: 6.3 G/DL — SIGNIFICANT CHANGE UP (ref 6–8.3)
PROT SERPL-MCNC: 6.4 G/DL — SIGNIFICANT CHANGE UP (ref 6–8.3)
RBC # BLD: 4.19 M/UL — SIGNIFICANT CHANGE UP (ref 3.8–5.2)
RBC # FLD: 12.3 % — SIGNIFICANT CHANGE UP (ref 10.3–14.5)
SODIUM SERPL-SCNC: 137 MMOL/L — SIGNIFICANT CHANGE UP (ref 135–145)
WBC # BLD: 4.33 K/UL — SIGNIFICANT CHANGE UP (ref 3.8–10.5)
WBC # FLD AUTO: 4.33 K/UL — SIGNIFICANT CHANGE UP (ref 3.8–10.5)

## 2021-01-18 PROCEDURE — 99222 1ST HOSP IP/OBS MODERATE 55: CPT | Mod: CS

## 2021-01-18 PROCEDURE — 99233 SBSQ HOSP IP/OBS HIGH 50: CPT | Mod: CS

## 2021-01-18 RX ORDER — INSULIN LISPRO 100/ML
VIAL (ML) SUBCUTANEOUS AT BEDTIME
Refills: 0 | Status: DISCONTINUED | OUTPATIENT
Start: 2021-01-18 | End: 2021-01-19

## 2021-01-18 RX ADMIN — Medication 6 MILLIGRAM(S): at 05:06

## 2021-01-18 RX ADMIN — PANTOPRAZOLE SODIUM 40 MILLIGRAM(S): 20 TABLET, DELAYED RELEASE ORAL at 05:06

## 2021-01-18 RX ADMIN — ENOXAPARIN SODIUM 40 MILLIGRAM(S): 100 INJECTION SUBCUTANEOUS at 21:01

## 2021-01-18 RX ADMIN — ATORVASTATIN CALCIUM 10 MILLIGRAM(S): 80 TABLET, FILM COATED ORAL at 21:01

## 2021-01-18 RX ADMIN — Medication 3: at 17:46

## 2021-01-18 RX ADMIN — Medication 4: at 12:17

## 2021-01-18 RX ADMIN — AMLODIPINE BESYLATE 10 MILLIGRAM(S): 2.5 TABLET ORAL at 05:06

## 2021-01-18 RX ADMIN — Medication 81 MILLIGRAM(S): at 12:17

## 2021-01-18 RX ADMIN — LOSARTAN POTASSIUM 50 MILLIGRAM(S): 100 TABLET, FILM COATED ORAL at 05:06

## 2021-01-18 RX ADMIN — Medication 2: at 21:16

## 2021-01-18 RX ADMIN — REMDESIVIR 500 MILLIGRAM(S): 5 INJECTION INTRAVENOUS at 21:01

## 2021-01-18 RX ADMIN — Medication 2: at 09:06

## 2021-01-18 NOTE — CONSULT NOTE ADULT - SUBJECTIVE AND OBJECTIVE BOX
Patient is a 85y old  Female who presents with a chief complaint of subj fevers& weakness since 2 weeks (17 Jan 2021 11:17)      HPI:  85yr F since 6 days with low grade fever subjective, cough& worsened since 2 days accompanied by malaise, loss of appetite. O2 initially 95-97% ,this am 89% when Daughter was concerned and called EMS. Pt is Independent&functional, no memory impairment at baseline  Positive contacts at home: daughter& grandson with similar sx, daughter found she was positive on 1/6.   Pt had a Tele visit 1/15: Z pack started yesterday  Hx of yearly Flu&Pneumonia infections, did not receive the Flu shot this yr. No hx of intubation  PMHX: DM,HTN,HLD, CVA/TIA-4 mths ago with full resolution in neurological sx in 3days (confused, no speech or limb paralysis at [facility]). No known CAD Prescribed ASA 325mg after stroke, pt has instead been taking ASA 81mg daily  NKDA. NFA.  Not on  insulin injection  Surgery([place]):  lap vanda 30yrs ago, Hysterectomy 1995    Social: no alc /smoke  FHx:  mother- HTN,CVA    (16 Jan 2021 19:32)       ROS:  Negative except for:    PAST MEDICAL & SURGICAL HISTORY:  Arthropathies    GERD without esophagitis    Dyslipidemia    Hypertension    Diabetes mellitus    S/P cholecystectomy    Status post hysterectomy        SOCIAL HISTORY:    FAMILY HISTORY:  FH: stroke    FH: HTN (hypertension)        MEDICATIONS  (STANDING):  amLODIPine   Tablet 10 milliGRAM(s) Oral daily  aspirin enteric coated 81 milliGRAM(s) Oral daily  atorvastatin 10 milliGRAM(s) Oral at bedtime  dexAMETHasone  Injectable 6 milliGRAM(s) IV Push daily  dextrose 40% Gel 15 Gram(s) Oral once  dextrose 5%. 1000 milliLiter(s) (50 mL/Hr) IV Continuous <Continuous>  dextrose 5%. 1000 milliLiter(s) (100 mL/Hr) IV Continuous <Continuous>  dextrose 50% Injectable 25 Gram(s) IV Push once  dextrose 50% Injectable 12.5 Gram(s) IV Push once  dextrose 50% Injectable 25 Gram(s) IV Push once  enoxaparin Injectable 40 milliGRAM(s) SubCutaneous every 24 hours  glucagon  Injectable 1 milliGRAM(s) IntraMuscular once  insulin lispro (ADMELOG) corrective regimen sliding scale   SubCutaneous three times a day before meals  losartan 50 milliGRAM(s) Oral daily  pantoprazole    Tablet 40 milliGRAM(s) Oral before breakfast  remdesivir  IVPB   IV Intermittent   remdesivir  IVPB 100 milliGRAM(s) IV Intermittent every 24 hours    MEDICATIONS  (PRN):  acetaminophen   Tablet .. 650 milliGRAM(s) Oral every 4 hours PRN Temp greater or equal to 38.5C (101.3F)  benzonatate 100 milliGRAM(s) Oral three times a day PRN Cough  guaifenesin/dextromethorphan  Syrup 10 milliLiter(s) Oral every 4 hours PRN Cough      Allergies    No Known Allergies    Intolerances        Vital Signs Last 24 Hrs  T(C): 36.4 (17 Jan 2021 07:45), Max: 37.2 (16 Jan 2021 16:11)  T(F): 97.6 (17 Jan 2021 07:45), Max: 98.9 (16 Jan 2021 16:11)  HR: 67 (17 Jan 2021 07:45) (65 - 94)  BP: 116/71 (17 Jan 2021 07:45) (116/71 - 136/72)  BP(mean): --  RR: 18 (17 Jan 2021 07:45) (16 - 18)  SpO2: 95% (17 Jan 2021 07:45) (95% - 97%)    PHYSICAL EXAM      LABS:    CBC Full  -  ( 17 Jan 2021 12:06 )  WBC Count : 2.29 K/uL  RBC Count : 4.53 M/uL  Hemoglobin : 13.6 g/dL  Hematocrit : 40.1 %  Platelet Count - Automated : 151 K/uL  Mean Cell Volume : 88.5 fl  Mean Cell Hemoglobin : 30.0 pg  Mean Cell Hemoglobin Concentration : 33.9 gm/dL  Auto Neutrophil # : x  Auto Lymphocyte # : x  Auto Monocyte # : x  Auto Eosinophil # : x  Auto Basophil # : x  Auto Neutrophil % : x  Auto Lymphocyte % : x  Auto Monocyte % : x  Auto Eosinophil % : x  Auto Basophil % : x    01-17    139  |  105  |  15  ----------------------------<  248<H>  4.1   |  23  |  0.90    Ca    8.6      17 Jan 2021 07:51    TPro  6.9  /  Alb  3.1<L>  /  TBili  0.7  /  DBili  .20  /  AST  23  /  ALT  28  /  AlkPhos  59  01-17      Ferritin, Serum: 419 ng/mL (01-16 @ 17:15)          BLOOD SMEAR INTERPRETATION:    RADIOLOGY & ADDITIONAL STUDIES:      < from: Xray Chest 1 View- PORTABLE-Urgent (01.16.21 @ 13:50) >    EXAM:  XR CHEST PORTABLE URGENT 1V                            PROCEDURE DATE:  01/16/2021            INTERPRETATION:  CLINICAL INFORMATION: Shortness of breath, cough, fever.    EXAM: Frontal radiograph of the chest.    COMPARISON: Chest radiographfrom 5/20/2019.    FINDINGS:  Bibasilar patchy opacities.  There is no definite pleural effusion. No pneumothorax.  The heart size is not well evaluated on this projection.  The visualized osseous structures demonstrate no acute pathology.    IMPRESSION:  Bibasilar patchy opacities for which differential includes atypical viral pneumonia including Covid 19.              BONY CASTRO MD; Resident Radiology  This document has been electronically signed.  ADIA CONTRERAS MD; Attending Radiologist  This document has been electronically signed. Jan 16 2021  3:10PM    < end of copied text >  
[facility]  INFECTIOUS DISEASES   =======================================================    [id number]  TYLOR HOLLAND     Daughter interpreting on the phone as per patient's request     CC: fevers& weakness since 2 weeks     HPI:  86yo woman with PMH of HTN, DM2, chronic joint pain was admitted with subjective fever and cough and malaise. Her So2 on admission at home dropped to 89% so daughter brought her to ED.   Currently not on O2, walking to bathroom with no issue. This morning feels better and doens't have any GI symptoms, appetite has improved.     PAST MEDICAL & SURGICAL HISTORY:  Arthropathies  GERD without esophagitis  Dyslipidemia  Hypertension  Diabetes mellitus  S/P cholecystectomy  Status post hysterectomy    Social Hx: no smoking, ETOH or drugs     FAMILY HISTORY:  FH: stroke  FH: HTN (hypertension)    Allergies  No Known Allergies    Antibiotics:  dexAMETHasone  Injectable 6 milliGRAM(s) IV Push daily  remdesivir  IVPB   IV Intermittent      REVIEW OF SYSTEMS:  CONSTITUTIONAL:  No Fever or chills, weakness+  HEENT:  No diplopia or blurred vision.  No sore throat or runny nose.  CARDIOVASCULAR:  No chest pain or SOB.  RESPIRATORY:  no cough, shortness of breath  GASTROINTESTINAL:  No nausea, vomiting or diarrhea.  GENITOURINARY:  No dysuria, frequency or urgency. No Blood in urine  MUSCULOSKELETAL:  no joint aches, no muscle pain  SKIN:  No change in skin, hair or nails.  NEUROLOGIC:  No paresthesias, fasciculations, seizures or weakness.  PSYCHIATRIC:  No disorder of thought or mood.  ENDOCRINE:  No heat or cold intolerance, polyuria or polydipsia.  HEMATOLOGICAL:  No easy bruising or bleeding.     Physical Exam:  Vital Signs Last 24 Hrs  T(C): 36.3 (2021 08:26), Max: 36.6 (2021 20:27)  T(F): 97.4 (2021 08:26), Max: 97.9 (2021 20:27)  HR: 65 (2021 08:26) (59 - 65)  BP: 117/72 (2021 08:26) (113/67 - 143/77)  RR: 18 (2021 08:26) (18 - 20)  SpO2: 94% (2021 08:26) (92% - 97%)  GEN: NAD  HEENT: normocephalic and atraumatic. EOMI. PERRL.    NECK: Supple.  No lymphadenopathy   LUNGS: Clear to auscultation.  HEART: Regular rate and rhythm   ABDOMEN: Soft, nontender, and nondistended.  Positive bowel sounds.    EXTREMITIES: Without edema.  NEUROLOGIC: grossly intact.  PSYCHIATRIC: Appropriate affect .  SKIN: No rash    Labs:      137  |  106  |  20  ----------------------------<  212<H>  4.2   |  22  |  0.97    Ca    8.5      2021 07:35    TPro  6.4  /  Alb  2.9<L>  /  TBili  0.6  /  DBili  .20  /  AST  21  /  ALT  22  /  AlkPhos  54                          12.6   4.33  )-----------( 166      ( 2021 07:35 )             37.2     Urinalysis Basic - ( 2021 10:24 )    Color: Yellow / Appearance: Turbid / S.025 / pH: x  Gluc: x / Ketone: Small  / Bili: Negative / Urobili: Negative   Blood: x / Protein: 30 mg/dL / Nitrite: Negative   Leuk Esterase: Negative / RBC: Negative /HPF / WBC Negative   Sq Epi: x / Non Sq Epi: Negative / Bacteria: Negative      LIVER FUNCTIONS - ( 2021 07:35 )  Alb: 2.9 g/dL / Pro: 6.4 g/dL / ALK PHOS: 54 U/L / ALT: 22 U/L / AST: 21 U/L / GGT: x           All imaging and other data have been reviewed.  < from: Xray Chest 1 View- PORTABLE-Urgent (21 @ 13:50) >  EXAM:  XR CHEST PORTABLE URGENT 1V                        PROCEDURE DATE:  2021    INTERPRETATION:  CLINICAL INFORMATION: Shortness of breath, cough, fever.  EXAM: Frontal radiograph of the chest.  COMPARISON: Chest radiographfrom 2019.  FINDINGS:  Bibasilar patchy opacities.  There is no definite pleural effusion. No pneumothorax.  The heart size is not well evaluated on this projection.  The visualized osseous structures demonstrate no acute pathology.  IMPRESSION:  Bibasilar patchy opacities for which differential includes atypical viral pneumonia including Covid 19.      Assessment and Plan:   86yo woman with PMH of HTN, DM2, chronic joint pain was admitted with subjective fever and cough and malaise. Her So2 on admission at home dropped to 89% so daughter brought her to ED.   Treatment usually is different case by case, data suggest that these might work:   Remdisivir:  5 day course,  eGFR > 30 mL/min , ALT < 5X ULN  Steroid: For hypoxic patients on supplemental O2 of intubated. dexamethasone 6mg PO or IV Q-day x 10 days (equivalent to solumedrol 32mg IV or Prednisone 40mg)  Anticoagulation:  with prophylactic dosing, full dose to be considered in patients with increased risk for thromboembolic complications. Bleeding can happen but acceptable in high risk patients due to hypercoagulable state.  LMWH is good, for high risk patients consider discharge on oral anticoagulation with rivaroxaban (Xarelto) 10mg PO QD or Eliquis (apixaban) 2.5-5mg PO BID  x 4 weeks.  Currently data and recommendations for COVID-19 treatment are rapidly changing, so this treatment plan is based on my clinical judgement with available information.     COVID 19   - BMP, CBC w diff, NLR. Procalcitonin, Ferritin, CRP, LDH and D dimer for the start and periodically can be repeated.   - CXR with bilateral opacities more consistent with viral pneumonia   - Continue Remdisivir 200mg stat and 100mg daily for 4 more days (total 5days) or until when ready for discharge whichever comes first.   - Continue Dexamethasone 6mg po daily for 10days  - Follow Creat and LFTs daily while on Remdisivir.    - Watch O2 sat closely and taper as tolerated.   - No antibiotics at this time.  - Prophylactic anticoagulation due to hypercoagulable state   Patient seems stable, off O2 with no issue, from ID stand point can stop Remdisivir and discharge her with a pulse ox to monitor So2.     Will follow PRN, please call with any question or change in her status.     Dr. Marco Taylor   Infectious Diseases   Please call [phone] between 8am and 6pm, call [phone] after 6pm or weekends.

## 2021-01-18 NOTE — INPATIENT CERTIFICATION FOR MEDICARE PATIENTS - RISKS OF ADVERSE EVENTS
Concern for cardiopulmonary deterioration/Concern for worsening infectious process/Concern for delay in diagnosis and treatment/Other:

## 2021-01-18 NOTE — PROGRESS NOTE ADULT - ATTENDING COMMENTS
apprec PT eval, dc likely tomorrow, no desats on ambulation
D/w daughter, patient. Plan as above. Documented by attending

## 2021-01-18 NOTE — PROGRESS NOTE ADULT - ASSESSMENT
85yr F since 6 days with low grade fever subjective, cough& worsened since 2 days accompanied by malaise, loss of appetite admitted for acute hypoxic respiratory failure secondary to COVID 19 pneumonia 
86 yo woman admitted with COVID pneumonia, noted to have leukopenia with lymphopenia; started on decadron and remdesivir;     - remains symptomatic with cough  - noted improvement in WBC and slowly rising lymphocyte count as well; ANC adequate at >2900  - continue supportive measures  - stable from heme/onc standpoint  - will sign off; reconsult if needed  - case discussed with Dr. Cano (hospitalist)
85yr F since 6 days with low grade fever subjective, cough& worsened since 2 days accompanied by malaise, loss of appetite admitted for acute hypoxic respiratory failure secondary to COVID 19 pneumonia

## 2021-01-18 NOTE — PROGRESS NOTE ADULT - PROBLEM SELECTOR PLAN 1
cxr reviewed - viral pna  On O2 via NC for now  Crp, ferritin, D Dimer Q 48 hours   COVID-19--critical period for decompensation  (7-14 days post symptom onset), avoid aerosolizing procedures, NSAIDs   DVT p with Lovenox   tylenol and robitussin &benzonatate PRN for sx management  Continue Decadron 6mg iv daily for Covid 19 with Hypoxemia (1st dose received in ED)  Continue  remdesivir 200mg IV once followed by 100mg IV Qd x4 to complete 5 days (meets criteria)  - monitor respiratory status closely ( route of 02 and saturation)Fio2 and o2 support - titration - keep sat > 88 pct  assist with needs and ADL as needed.  goals of care d/w daughter: DNR/DNI  MOLST completed with verbal consent as per patient's wishes as told to daughter who is agreeable  monitor biomarkers- CBC w diff  for NLR <3 low vs >5 high) Ferritin (lower risk <450 vs >850) CRP (low risk <2 and higher risk >6) and LDH, D Dimer, procalcitonin  - trend temp & CBC  -continue supportive care  -therapies remains investigational  -isolation precautions per protocol.  -ID consulted Dr. Taylor
cxr reviewed - viral pna  On O2 via NC for now  Crp, ferritin, D Dimer Q 48 hours   COVID-19--critical period for decompensation  (7-14 days post symptom onset), avoid aerosolizing procedures, NSAIDs   DVT p with Lovenox   tylenol and robitussin &benzonatate PRN for sx management  Continue Decadron 6mg iv daily for Covid 19 with Hypoxemia (1st dose received in ED)  Continue  remdesivir 200mg IV once followed by 100mg IV Qd x4 to complete 5 days (meets criteria)  - monitor respiratory status closely ( route of 02 and saturation)Fio2 and o2 support - titration - keep sat > 88 pct  assist with needs and ADL as needed.  goals of care d/w daughter: DNR/DNI  MOLST completed with verbal consent as per patient's wishes as told to daughter who is agreeable  monitor biomarkers- CBC w diff  for NLR <3 low vs >5 high) Ferritin (lower risk <450 vs >850) CRP (low risk <2 and higher risk >6) and LDH, D Dimer, procalcitonin  - trend temp & CBC  -continue supportive care  -therapies remains investigational  -isolation precautions per protocol.  -ID consulted Dr. Taylor

## 2021-01-18 NOTE — PROGRESS NOTE ADULT - PROBLEM SELECTOR PROBLEM 1
Acute respiratory failure, unspecified whether with hypoxia or hypercapnia
Acute respiratory failure, unspecified whether with hypoxia or hypercapnia

## 2021-01-19 ENCOUNTER — TRANSCRIPTION ENCOUNTER (OUTPATIENT)
Age: 86
End: 2021-01-19

## 2021-01-19 VITALS
TEMPERATURE: 98 F | RESPIRATION RATE: 19 BRPM | DIASTOLIC BLOOD PRESSURE: 76 MMHG | OXYGEN SATURATION: 92 % | HEART RATE: 72 BPM | SYSTOLIC BLOOD PRESSURE: 146 MMHG

## 2021-01-19 PROBLEM — Z00.00 ENCOUNTER FOR PREVENTIVE HEALTH EXAMINATION: Status: ACTIVE | Noted: 2021-01-19

## 2021-01-19 LAB
ALBUMIN SERPL ELPH-MCNC: 3.1 G/DL — LOW (ref 3.3–5)
ALBUMIN SERPL ELPH-MCNC: 3.1 G/DL — LOW (ref 3.3–5)
ALP SERPL-CCNC: 58 U/L — SIGNIFICANT CHANGE UP (ref 40–120)
ALP SERPL-CCNC: 58 U/L — SIGNIFICANT CHANGE UP (ref 40–120)
ALT FLD-CCNC: 26 U/L — SIGNIFICANT CHANGE UP (ref 12–78)
ALT FLD-CCNC: 26 U/L — SIGNIFICANT CHANGE UP (ref 12–78)
ANION GAP SERPL CALC-SCNC: 9 MMOL/L — SIGNIFICANT CHANGE UP (ref 5–17)
AST SERPL-CCNC: 22 U/L — SIGNIFICANT CHANGE UP (ref 15–37)
AST SERPL-CCNC: 23 U/L — SIGNIFICANT CHANGE UP (ref 15–37)
BASOPHILS # BLD AUTO: 0 K/UL — SIGNIFICANT CHANGE UP (ref 0–0.2)
BASOPHILS NFR BLD AUTO: 0 % — SIGNIFICANT CHANGE UP (ref 0–2)
BILIRUB DIRECT SERPL-MCNC: 0.3 MG/DL — HIGH (ref 0.05–0.2)
BILIRUB INDIRECT FLD-MCNC: 0.4 MG/DL — SIGNIFICANT CHANGE UP (ref 0.2–1)
BILIRUB SERPL-MCNC: 0.7 MG/DL — SIGNIFICANT CHANGE UP (ref 0.2–1.2)
BILIRUB SERPL-MCNC: 0.7 MG/DL — SIGNIFICANT CHANGE UP (ref 0.2–1.2)
BUN SERPL-MCNC: 17 MG/DL — SIGNIFICANT CHANGE UP (ref 7–23)
CALCIUM SERPL-MCNC: 8.9 MG/DL — SIGNIFICANT CHANGE UP (ref 8.5–10.1)
CHLORIDE SERPL-SCNC: 104 MMOL/L — SIGNIFICANT CHANGE UP (ref 96–108)
CO2 SERPL-SCNC: 24 MMOL/L — SIGNIFICANT CHANGE UP (ref 22–31)
CREAT SERPL-MCNC: 0.9 MG/DL — SIGNIFICANT CHANGE UP (ref 0.5–1.3)
EOSINOPHIL # BLD AUTO: 0 K/UL — SIGNIFICANT CHANGE UP (ref 0–0.5)
EOSINOPHIL NFR BLD AUTO: 0 % — SIGNIFICANT CHANGE UP (ref 0–6)
GLUCOSE SERPL-MCNC: 234 MG/DL — HIGH (ref 70–99)
HCT VFR BLD CALC: 39.6 % — SIGNIFICANT CHANGE UP (ref 34.5–45)
HGB BLD-MCNC: 13.6 G/DL — SIGNIFICANT CHANGE UP (ref 11.5–15.5)
IMM GRANULOCYTES NFR BLD AUTO: 1.1 % — SIGNIFICANT CHANGE UP (ref 0–1.5)
LYMPHOCYTES # BLD AUTO: 0.75 K/UL — LOW (ref 1–3.3)
LYMPHOCYTES # BLD AUTO: 17 % — SIGNIFICANT CHANGE UP (ref 13–44)
MCHC RBC-ENTMCNC: 30 PG — SIGNIFICANT CHANGE UP (ref 27–34)
MCHC RBC-ENTMCNC: 34.3 GM/DL — SIGNIFICANT CHANGE UP (ref 32–36)
MCV RBC AUTO: 87.4 FL — SIGNIFICANT CHANGE UP (ref 80–100)
MONOCYTES # BLD AUTO: 0.35 K/UL — SIGNIFICANT CHANGE UP (ref 0–0.9)
MONOCYTES NFR BLD AUTO: 7.9 % — SIGNIFICANT CHANGE UP (ref 2–14)
NEUTROPHILS # BLD AUTO: 3.27 K/UL — SIGNIFICANT CHANGE UP (ref 1.8–7.4)
NEUTROPHILS NFR BLD AUTO: 74 % — SIGNIFICANT CHANGE UP (ref 43–77)
NRBC # BLD: 0 /100 WBCS — SIGNIFICANT CHANGE UP (ref 0–0)
PLATELET # BLD AUTO: 183 K/UL — SIGNIFICANT CHANGE UP (ref 150–400)
POTASSIUM SERPL-MCNC: 4.3 MMOL/L — SIGNIFICANT CHANGE UP (ref 3.5–5.3)
POTASSIUM SERPL-SCNC: 4.3 MMOL/L — SIGNIFICANT CHANGE UP (ref 3.5–5.3)
PROT SERPL-MCNC: 6.6 G/DL — SIGNIFICANT CHANGE UP (ref 6–8.3)
PROT SERPL-MCNC: 6.6 G/DL — SIGNIFICANT CHANGE UP (ref 6–8.3)
RBC # BLD: 4.53 M/UL — SIGNIFICANT CHANGE UP (ref 3.8–5.2)
RBC # FLD: 12.2 % — SIGNIFICANT CHANGE UP (ref 10.3–14.5)
SARS-COV-2 RNA SPEC QL NAA+PROBE: DETECTED
SODIUM SERPL-SCNC: 137 MMOL/L — SIGNIFICANT CHANGE UP (ref 135–145)
WBC # BLD: 4.42 K/UL — SIGNIFICANT CHANGE UP (ref 3.8–10.5)
WBC # FLD AUTO: 4.42 K/UL — SIGNIFICANT CHANGE UP (ref 3.8–10.5)

## 2021-01-19 PROCEDURE — 82962 GLUCOSE BLOOD TEST: CPT

## 2021-01-19 PROCEDURE — 36415 COLL VENOUS BLD VENIPUNCTURE: CPT

## 2021-01-19 PROCEDURE — 84443 ASSAY THYROID STIM HORMONE: CPT

## 2021-01-19 PROCEDURE — 97161 PT EVAL LOW COMPLEX 20 MIN: CPT

## 2021-01-19 PROCEDURE — 82565 ASSAY OF CREATININE: CPT

## 2021-01-19 PROCEDURE — 80061 LIPID PANEL: CPT

## 2021-01-19 PROCEDURE — 99232 SBSQ HOSP IP/OBS MODERATE 35: CPT | Mod: CS

## 2021-01-19 PROCEDURE — U0005: CPT

## 2021-01-19 PROCEDURE — 83615 LACTATE (LD) (LDH) ENZYME: CPT

## 2021-01-19 PROCEDURE — 86140 C-REACTIVE PROTEIN: CPT

## 2021-01-19 PROCEDURE — 80053 COMPREHEN METABOLIC PANEL: CPT

## 2021-01-19 PROCEDURE — 80076 HEPATIC FUNCTION PANEL: CPT

## 2021-01-19 PROCEDURE — 99239 HOSP IP/OBS DSCHRG MGMT >30: CPT | Mod: CS

## 2021-01-19 PROCEDURE — 85025 COMPLETE CBC W/AUTO DIFF WBC: CPT

## 2021-01-19 PROCEDURE — 83036 HEMOGLOBIN GLYCOSYLATED A1C: CPT

## 2021-01-19 PROCEDURE — 86769 SARS-COV-2 COVID-19 ANTIBODY: CPT

## 2021-01-19 PROCEDURE — 85027 COMPLETE CBC AUTOMATED: CPT

## 2021-01-19 PROCEDURE — U0003: CPT

## 2021-01-19 PROCEDURE — 85379 FIBRIN DEGRADATION QUANT: CPT

## 2021-01-19 PROCEDURE — 81001 URINALYSIS AUTO W/SCOPE: CPT

## 2021-01-19 RX ORDER — MELOXICAM 15 MG/1
1 TABLET ORAL
Qty: 0 | Refills: 0 | DISCHARGE

## 2021-01-19 RX ORDER — GUAIFENESIN/DEXTROMETHORPHAN 600MG-30MG
10 TABLET, EXTENDED RELEASE 12 HR ORAL
Qty: 0 | Refills: 0 | DISCHARGE
Start: 2021-01-19

## 2021-01-19 RX ORDER — AZITHROMYCIN 500 MG/1
1 TABLET, FILM COATED ORAL
Qty: 0 | Refills: 0 | DISCHARGE

## 2021-01-19 RX ORDER — ACETAMINOPHEN 500 MG
2 TABLET ORAL
Qty: 0 | Refills: 0 | DISCHARGE
Start: 2021-01-19

## 2021-01-19 RX ADMIN — Medication 81 MILLIGRAM(S): at 13:03

## 2021-01-19 RX ADMIN — Medication 6 MILLIGRAM(S): at 04:47

## 2021-01-19 RX ADMIN — AMLODIPINE BESYLATE 10 MILLIGRAM(S): 2.5 TABLET ORAL at 04:47

## 2021-01-19 RX ADMIN — LOSARTAN POTASSIUM 50 MILLIGRAM(S): 100 TABLET, FILM COATED ORAL at 04:48

## 2021-01-19 RX ADMIN — Medication 3: at 09:01

## 2021-01-19 RX ADMIN — Medication 3: at 17:39

## 2021-01-19 RX ADMIN — PANTOPRAZOLE SODIUM 40 MILLIGRAM(S): 20 TABLET, DELAYED RELEASE ORAL at 04:48

## 2021-01-19 RX ADMIN — Medication 4: at 13:03

## 2021-01-19 NOTE — PROGRESS NOTE ADULT - SUBJECTIVE AND OBJECTIVE BOX
[facility]  INFECTIOUS DISEASES   =======================================================    [facility]-[id number]  TYLOR HOLLAND     Follow up; COVID    This morning is off O2, NAD, comfortable. No fever.     PAST MEDICAL & SURGICAL HISTORY:  Arthropathies  GERD without esophagitis  Dyslipidemia  Hypertension  Diabetes mellitus  S/P cholecystectomy  Status post hysterectomy    Social Hx: no smoking, ETOH or drugs     FAMILY HISTORY:  FH: stroke  FH: HTN (hypertension)    Allergies  No Known Allergies    Antibiotics:  dexAMETHasone  Injectable 6 milliGRAM(s) IV Push daily  remdesivir  IVPB   IV Intermittent      REVIEW OF SYSTEMS:  CONSTITUTIONAL:  No Fever or chills, weakness+  HEENT:  No diplopia or blurred vision.  No sore throat or runny nose.  CARDIOVASCULAR:  No chest pain or SOB.  RESPIRATORY:  no cough, shortness of breath  GASTROINTESTINAL:  No nausea, vomiting or diarrhea.  GENITOURINARY:  No dysuria, frequency or urgency. No Blood in urine  MUSCULOSKELETAL:  no joint aches, no muscle pain  SKIN:  No change in skin, hair or nails.  NEUROLOGIC:  No paresthesias, fasciculations, seizures or weakness.  PSYCHIATRIC:  No disorder of thought or mood.  ENDOCRINE:  No heat or cold intolerance, polyuria or polydipsia.  HEMATOLOGICAL:  No easy bruising or bleeding.     Physical Exam:  Vital Signs Last 24 Hrs  T(C): 36.8 (2021 11:19), Max: 37.1 (2021 04:30)  T(F): 98.2 (2021 11:19), Max: 98.8 (2021 04:30)  HR: 63 (2021 11:19) (57 - 63)  BP: 127/72 (2021 11:19) (125/66 - 133/76)  BP(mean): --  RR: 18 (2021 11:19) (18 - 19)  SpO2: 93% (2021 11:19) (90% - 93%)  GEN: NAD  HEENT: normocephalic and atraumatic. EOMI. PERRL.    NECK: Supple.  No lymphadenopathy   LUNGS: Clear to auscultation.  HEART: Regular rate and rhythm   ABDOMEN: Soft, nontender, and nondistended.  Positive bowel sounds.    EXTREMITIES: Without edema.  NEUROLOGIC: grossly intact.  PSYCHIATRIC: Appropriate affect .  SKIN: No rash    Labs:      137  |  106  |  20  ----------------------------<  212<H>  4.2   |  22  |  0.97    Ca    8.5      2021 07:35    TPro  6.4  /  Alb  2.9<L>  /  TBili  0.6  /  DBili  .20  /  AST  21  /  ALT  22  /  AlkPhos  54                          12.6   4.33  )-----------( 166      ( 2021 07:35 )             37.2     Urinalysis Basic - ( 2021 10:24 )    Color: Yellow / Appearance: Turbid / S.025 / pH: x  Gluc: x / Ketone: Small  / Bili: Negative / Urobili: Negative   Blood: x / Protein: 30 mg/dL / Nitrite: Negative   Leuk Esterase: Negative / RBC: Negative /HPF / WBC Negative   Sq Epi: x / Non Sq Epi: Negative / Bacteria: Negative      LIVER FUNCTIONS - ( 2021 07:35 )  Alb: 2.9 g/dL / Pro: 6.4 g/dL / ALK PHOS: 54 U/L / ALT: 22 U/L / AST: 21 U/L / GGT: x           All imaging and other data have been reviewed.  < from: Xray Chest 1 View- PORTABLE-Urgent (21 @ 13:50) >  EXAM:  XR CHEST PORTABLE URGENT 1V                        PROCEDURE DATE:  2021    INTERPRETATION:  CLINICAL INFORMATION: Shortness of breath, cough, fever.  EXAM: Frontal radiograph of the chest.  COMPARISON: Chest radiographfrom 2019.  FINDINGS:  Bibasilar patchy opacities.  There is no definite pleural effusion. No pneumothorax.  The heart size is not well evaluated on this projection.  The visualized osseous structures demonstrate no acute pathology.  IMPRESSION:  Bibasilar patchy opacities for which differential includes atypical viral pneumonia including Covid 19.      Assessment and Plan:   84yo woman with PMH of HTN, DM2, chronic joint pain was admitted with subjective fever and cough and malaise. Her So2 on admission at home dropped to 89% so daughter brought her to ED.   Treatment usually is different case by case, data suggest that these might work:   Remdisivir:  5 day course,  eGFR > 30 mL/min , ALT < 5X ULN  Steroid: For hypoxic patients on supplemental O2 of intubated. dexamethasone 6mg PO or IV Q-day x 10 days (equivalent to solumedrol 32mg IV or Prednisone 40mg)  Anticoagulation:  with prophylactic dosing, full dose to be considered in patients with increased risk for thromboembolic complications. Bleeding can happen but acceptable in high risk patients due to hypercoagulable state.  LMWH is good, for high risk patients consider discharge on oral anticoagulation with rivaroxaban (Xarelto) 10mg PO QD or Eliquis (apixaban) 2.5-5mg PO BID  x 4 weeks.  Currently data and recommendations for COVID-19 treatment are rapidly changing, so this treatment plan is based on my clinical judgement with available information.     COVID 19   - BMP, CBC w diff, NLR. Procalcitonin, Ferritin, CRP, LDH and D dimer for the start and periodically can be repeated.   - CXR with bilateral opacities more consistent with viral pneumonia   - Continue Remdisivir 200mg stat and 100mg daily for 4 more days (total 5days) or until when ready for discharge whichever comes first.   - Continue Dexamethasone 6mg po daily for 10days  - Follow Creat and LFTs daily while on Remdisivir.    - Watch O2 sat closely and taper as tolerated.   - No antibiotics at this time.  - Prophylactic anticoagulation due to hypercoagulable state   Patient seems stable, off O2 with no issue, from ID stand point can stop Remdisivir and discharge her with a pulse ox to monitor So2.     Will sign off, please call with any question or change in her status.     Dr. Marco Taylor   Infectious Diseases   Please call [phone] between 8am and 6pm, call [phone] after 6pm or weekends.     
  Patient seen and examined;  Chart reviewed and events noted;   continues to cough; has some facial flushing (likely from steroids)    MEDICATIONS  (STANDING):  amLODIPine   Tablet 10 milliGRAM(s) Oral daily  aspirin enteric coated 81 milliGRAM(s) Oral daily  atorvastatin 10 milliGRAM(s) Oral at bedtime  dexAMETHasone  Injectable 6 milliGRAM(s) IV Push daily  dextrose 40% Gel 15 Gram(s) Oral once  enoxaparin Injectable 40 milliGRAM(s) SubCutaneous every 24 hours  glucagon  Injectable 1 milliGRAM(s) IntraMuscular once  insulin lispro (ADMELOG) corrective regimen sliding scale   SubCutaneous three times a day before meals  losartan 50 milliGRAM(s) Oral daily  pantoprazole    Tablet 40 milliGRAM(s) Oral before breakfast  remdesivir  IVPB   IV Intermittent   remdesivir  IVPB 100 milliGRAM(s) IV Intermittent every 24 hours    MEDICATIONS  (PRN):  acetaminophen   Tablet .. 650 milliGRAM(s) Oral every 4 hours PRN Temp greater or equal to 38.5C (101.3F)  benzonatate 100 milliGRAM(s) Oral three times a day PRN Cough  guaifenesin/dextromethorphan  Syrup 10 milliLiter(s) Oral every 4 hours PRN Cough      Vital Signs Last 24 Hrs  T(C): 36.3 (18 Jan 2021 08:26), Max: 36.6 (17 Jan 2021 20:27)  T(F): 97.4 (18 Jan 2021 08:26), Max: 97.9 (17 Jan 2021 20:27)  HR: 65 (18 Jan 2021 08:26) (59 - 65)  BP: 117/72 (18 Jan 2021 08:26) (113/67 - 143/77)  BP(mean): --  RR: 16 (18 Jan 2021 10:14) (16 - 20)  SpO2: 91% (18 Jan 2021 10:14) (91% - 97%)    PHYSICAL EXAM  General: adult in NAD  HEENT: clear oropharynx, anicteric sclera, pink conjunctivae; + facial flushing  Neck: supple  CV: normal S1S2 with no murmur rubs or gallops  Lungs: clear to auscultation, no wheezes, no rhales  Abdomen: soft non-tender non-distended, no hepato/splenomegaly  Ext: no clubbing cyanosis or edema  Skin: no rashes and no petichiae  Neuro: alert and oriented X3 no focal deficits      LABS:                        12.6   4.33  )-----------( 166      ( 18 Jan 2021 07:35 )             37.2     WBC Count: 4.33 K/uL (01-18 @ 07:35)  WBC Count: 2.29 K/uL (01-17 @ 12:06)  WBC Count: 1.55 K/uL (01-17 @ 07:51)  WBC Count: 3.16 K/uL (01-16 @ 13:40)    01-18    137  |  106  |  20  ----------------------------<  212<H>  4.2   |  22  |  0.97    Ca    8.5      18 Jan 2021 07:35    TPro  6.4  /  Alb  2.9<L>  /  TBili  0.6  /  DBili  .20  /  AST  21  /  ALT  22  /  AlkPhos  54  01-18    
Patient is a 85y old  Female who presents with a chief complaint of subj fevers& weakness since 2 weeks (16 Jan 2021 19:32)      INTERVAL HPI/OVERNIGHT EVENTS: Patient seen and examined at bedside. Daughter translated over her cell. Still feels sob, but better. Denies chest pain, palpitation.     MEDICATIONS  (STANDING):  amLODIPine   Tablet 10 milliGRAM(s) Oral daily  aspirin enteric coated 81 milliGRAM(s) Oral daily  atorvastatin 10 milliGRAM(s) Oral at bedtime  dexAMETHasone  Injectable 6 milliGRAM(s) IV Push daily  dextrose 40% Gel 15 Gram(s) Oral once  dextrose 5%. 1000 milliLiter(s) (50 mL/Hr) IV Continuous <Continuous>  dextrose 5%. 1000 milliLiter(s) (100 mL/Hr) IV Continuous <Continuous>  dextrose 50% Injectable 25 Gram(s) IV Push once  dextrose 50% Injectable 12.5 Gram(s) IV Push once  dextrose 50% Injectable 25 Gram(s) IV Push once  enoxaparin Injectable 40 milliGRAM(s) SubCutaneous every 24 hours  glucagon  Injectable 1 milliGRAM(s) IntraMuscular once  insulin lispro (ADMELOG) corrective regimen sliding scale   SubCutaneous three times a day before meals  losartan 50 milliGRAM(s) Oral daily  pantoprazole    Tablet 40 milliGRAM(s) Oral before breakfast  remdesivir  IVPB   IV Intermittent   remdesivir  IVPB 100 milliGRAM(s) IV Intermittent every 24 hours    MEDICATIONS  (PRN):  acetaminophen   Tablet .. 650 milliGRAM(s) Oral every 4 hours PRN Temp greater or equal to 38.5C (101.3F)  benzonatate 100 milliGRAM(s) Oral three times a day PRN Cough  guaifenesin/dextromethorphan  Syrup 10 milliLiter(s) Oral every 4 hours PRN Cough      Allergies    No Known Allergies    Intolerances        REVIEW OF SYSTEMS:  CONSTITUTIONAL: No fever, or fatigue   EYES: No eye pain, visual disturbances, or discharge  ENMT:  No difficulty hearing, tinnitus, vertigo; No sinus or throat pain  NECK: No pain or stiffness  RESPIRATORY: No cough, wheezing, chills or hemoptysis; + shortness of breath  CARDIOVASCULAR: No chest pain, palpitations, dizziness, or leg swelling  GASTROINTESTINAL: No abdominal or epigastric pain. No nausea, vomiting, or hematemesis; No diarrhea or constipation.   GENITOURINARY: No dysuria, frequency, hematuria, or incontinence  NEUROLOGICAL: No headaches, memory loss, loss of strength, numbness, or tremors  SKIN: No itching, burning, rashes, or lesions   MUSCULOSKELETAL: No joint pain or swelling; No muscle, back, or extremity pain  PSYCHIATRIC: No depression, anxiety, mood swings, or difficulty sleeping  HEME/LYMPH: No easy bruising, or bleeding gums  ALLERGY AND IMMUNOLOGIC: No hives or eczema    Vital Signs Last 24 Hrs  T(C): 36.4 (17 Jan 2021 07:45), Max: 37.2 (16 Jan 2021 16:11)  T(F): 97.6 (17 Jan 2021 07:45), Max: 98.9 (16 Jan 2021 16:11)  HR: 67 (17 Jan 2021 07:45) (65 - 94)  BP: 116/71 (17 Jan 2021 07:45) (110/72 - 136/72)  BP(mean): --  RR: 18 (17 Jan 2021 07:45) (16 - 20)  SpO2: 95% (17 Jan 2021 07:45) (92% - 97%)    PHYSICAL EXAM:  GENERAL: NAD, Awake, Alert   HEAD:  Atraumatic, Normocephalic  EYES: EOMI, PERRLA, conjunctiva and sclera clear  ENMT: No tonsillar erythema, exudates, or enlargement; Moist mucous membranes  NECK: Supple, No JVD, Normal thyroid  NERVOUS SYSTEM:  Alert & Oriented X 3, no focal motor/sensory deficits   CHEST/LUNG: Decreased  to auscultation bilaterally; No rales, rhonchi, wheezing   HEART: S1S2+, Regular rate and rhythm  ABDOMEN: Soft, Nontender, Nondistended; Bowel sounds present  EXTREMITIES:  2+ Peripheral Pulses, No clubbing, cyanosis  SKIN: No rashes, warm, dry     LABS:                        13.8   1.55  )-----------( 145      ( 17 Jan 2021 07:51 )             41.4     17 Jan 2021 07:51    139    |  105    |  15     ----------------------------<  248    4.1     |  23     |  0.90     Ca    8.6        17 Jan 2021 07:51    TPro  6.9    /  Alb  3.1    /  TBili  0.7    /  DBili  .20    /  AST  23     /  ALT  28     /  AlkPhos  59     17 Jan 2021 07:51      CAPILLARY BLOOD GLUCOSE        BLOOD CULTURE    RADIOLOGY & ADDITIONAL TESTS:    Imaging Personally Reviewed:  [ ] YES     Consultant(s) Notes Reviewed:      Care Discussed with Consultants/Other Providers:
Patient is a 85y old  Female who presents with a chief complaint of fevers (2021 14:34)      INTERVAL HPI: Pt seen and examined.  #[id number] for mandarin, pt states she is feeling better still has some headache and cough but is looking forward to going home soon. Denies any other acute complaints at this time.     OVERNIGHT EVENTS: none noted  T(F): 98.1 (21 @ 21:33), Max: 98.1 (21 @ 21:33)  HR: 57 (21 @ 21:33) (57 - 65)  BP: 125/66 (21 @ 21:33) (117/72 - 143/77)  RR: 18 (21 @ 21:33) (16 - 19)  SpO2: 92% (21 @ 21:33) (90% - 97%)  I&O's Summary      REVIEW OF SYSTEMS:  CONSTITUTIONAL: No fever, weight loss, or fatigue  RESPIRATORY: No cough, wheezing, chills or hemoptysis; No shortness of breath  CARDIOVASCULAR: No chest pain, palpitations, dizziness, or leg swelling  GASTROINTESTINAL: No abdominal or epigastric pain. No nausea, vomiting, or hematemesis; No diarrhea or constipation. No melena or hematochezia.  GENITOURINARY: No dysuria, frequency, hematuria, or incontinence  NEUROLOGICAL: No headaches, memory loss, loss of strength, numbness, or tremors  SKIN: No itching, burning, rashes, or lesions   MUSCULOSKELETAL: No joint pain or swelling; No muscle, back, or extremity pain  PSYCHIATRIC: No depression, anxiety, mood swings, or difficulty sleeping    PHYSICAL EXAM:  GENERAL: NAD, well-groomed, elder  NERVOUS SYSTEM:  Alert & Oriented X3, Good concentration; Motor Strength 3/5 B/L upper and lower extremities; DTRs 2+ intact and symmetric  CHEST/LUNG: slihgtly diminished Rbase >L but good wob  HEART: Regular rate and rhythm; No murmurs, rubs, or gallops  ABDOMEN: Soft, Nontender, Nondistended; Bowel sounds present  EXTREMITIES:  2+ Peripheral Pulses, No clubbing, cyanosis, or edema  SKIN: No rashes or lesions    LABS:                        12.6   4.33  )-----------( 166      ( 2021 07:35 )             37.2         137  |  106  |  20  ----------------------------<  212<H>  4.2   |  22  |  0.97    Ca    8.5      2021 07:35    TPro  6.4  /  Alb  2.9<L>  /  TBili  0.6  /  DBili  .20  /  AST  21  /  ALT  22  /  AlkPhos  54        Urinalysis Basic - ( 2021 10:24 )    Color: Yellow / Appearance: Turbid / S.025 / pH: x  Gluc: x / Ketone: Small  / Bili: Negative / Urobili: Negative   Blood: x / Protein: 30 mg/dL / Nitrite: Negative   Leuk Esterase: Negative / RBC: Negative /HPF / WBC Negative   Sq Epi: x / Non Sq Epi: Negative / Bacteria: Negative      CAPILLARY BLOOD GLUCOSE      POCT Blood Glucose.: 333 mg/dL (2021 20:34)  POCT Blood Glucose.: 267 mg/dL (2021 17:00)  POCT Blood Glucose.: 318 mg/dL (2021 12:10)  POCT Blood Glucose.: 232 mg/dL (2021 08:11)              MEDICATIONS  (STANDING):  amLODIPine   Tablet 10 milliGRAM(s) Oral daily  aspirin enteric coated 81 milliGRAM(s) Oral daily  atorvastatin 10 milliGRAM(s) Oral at bedtime  dexAMETHasone  Injectable 6 milliGRAM(s) IV Push daily  dextrose 40% Gel 15 Gram(s) Oral once  dextrose 5%. 1000 milliLiter(s) (50 mL/Hr) IV Continuous <Continuous>  dextrose 5%. 1000 milliLiter(s) (100 mL/Hr) IV Continuous <Continuous>  dextrose 50% Injectable 25 Gram(s) IV Push once  dextrose 50% Injectable 12.5 Gram(s) IV Push once  dextrose 50% Injectable 25 Gram(s) IV Push once  enoxaparin Injectable 40 milliGRAM(s) SubCutaneous every 24 hours  glucagon  Injectable 1 milliGRAM(s) IntraMuscular once  insulin lispro (ADMELOG) corrective regimen sliding scale   SubCutaneous three times a day before meals  insulin lispro (ADMELOG) corrective regimen sliding scale   SubCutaneous at bedtime  losartan 50 milliGRAM(s) Oral daily  pantoprazole    Tablet 40 milliGRAM(s) Oral before breakfast  remdesivir  IVPB   IV Intermittent   remdesivir  IVPB 100 milliGRAM(s) IV Intermittent every 24 hours    MEDICATIONS  (PRN):  acetaminophen   Tablet .. 650 milliGRAM(s) Oral every 4 hours PRN Temp greater or equal to 38.5C (101.3F)  benzonatate 100 milliGRAM(s) Oral three times a day PRN Cough  guaifenesin/dextromethorphan  Syrup 10 milliLiter(s) Oral every 4 hours PRN Cough

## 2021-01-19 NOTE — DISCHARGE NOTE NURSING/CASE MANAGEMENT/SOCIAL WORK - PATIENT PORTAL LINK FT
You can access the FollowMyHealth Patient Portal offered by [facility] by registering at the following website: [email or web address]. By joining [facility]’s FollowMyHealth portal, you will also be able to view your health information using other applications (apps) compatible with our system.

## 2021-01-19 NOTE — DISCHARGE NOTE PROVIDER - CARE PROVIDER_API CALL
Xander Hairston  your primary medical provider  Phone: (   )    -  Fax: (   )    -  Established Patient  Follow Up Time: 1 week    Marco Taylor)  Infectious Disease; Internal Medicine  [address] [place] [address], [place] [address]  Phone: [phone]  Fax: [phone]  Follow Up Time:

## 2021-01-19 NOTE — DISCHARGE NOTE PROVIDER - NSDCCPCAREPLAN_GEN_ALL_CORE_FT
PRINCIPAL DISCHARGE DIAGNOSIS  Diagnosis: COVID-19  Assessment and Plan of Treatment: you were given supplemetnal oxygen and seen by infectious disease and medicine team who optimized your clinical care, you showed improvement and were able to wean off oxygen, you were seen by physical therapy and deemed stable to go home wihtout additional phyiscal therapy needs, please follow up with your primary medical provider, please quarantine from 14 days of onset of your symptoms and fever free for 72 hours

## 2021-01-19 NOTE — DISCHARGE NOTE PROVIDER - PROVIDER TOKENS
FREE:[LAST:[Faiza pino],FIRST:[Xander],PHONE:[(   )    -],FAX:[(   )    -],ADDRESS:[your primary medical provider],FOLLOWUP:[1 week],ESTABLISHEDPATIENT:[T]],PROVIDER:[TOKEN:[[id number]]]

## 2021-01-19 NOTE — DISCHARGE NOTE PROVIDER - NSDCMRMEDTOKEN_GEN_ALL_CORE_FT
acetaminophen 325 mg oral tablet: 2 tab(s) orally every 4 hours, As needed, Temp greater or equal to 38.5C (101.3F)  amLODIPine 5 mg oral tablet: 1  orally 2 times a day  aspirin 81 mg oral tablet: 1 tab(s) orally once a day  atorvastatin 10 mg oral tablet: 1  orally once a day (at bedtime)  bisoprolol 10 mg oral tablet: 1 tab(s) orally once a day  gabapentin: 1000 milligram(s) orally 2 times a day, As Needed  glipizide-metformin 2.5 mg-500 mg oral tablet: 1 tab(s) orally 2 times a day  guaifenesin-dextromethorphan 100 mg-10 mg/5 mL oral liquid: 10 milliliter(s) orally every 4 hours, As needed, Cough  Janumet 50 mg-1000 mg oral tablet: 1 tab(s) orally once a day (at bedtime)  losartan 50 mg oral tablet: 1  orally once a day  omeprazole 40 mg oral delayed release capsule: 1 cap(s) orally once a day

## 2021-01-19 NOTE — PROGRESS NOTE ADULT - REASON FOR ADMISSION
fevers
subj fevers& weakness since 2 weeks

## 2021-01-19 NOTE — PHYSICAL THERAPY INITIAL EVALUATION ADULT - ADDITIONAL COMMENTS
Information obtained from EMR: pt lives with her dtr in a house and has 20 steps to enter/exit. Pt ambulates independently with SC. Pt has home health aide 5 hrs 7xwk.

## 2021-01-19 NOTE — DISCHARGE NOTE PROVIDER - HOSPITAL COURSE
85yr F since 6 days with low grade fever subjective, cough& worsened since 2 days accompanied by malaise, loss of appetite admitted for acute hypoxic respiratory failure secondary to COVID 19 pneumonia. Pt was seen by ID, medicine and physical therapy whom collaborated in her care for clinical improvement. Pt was able to wean off supplemental oxygen and is medically stable for discharge with close follow with pmd.       Time spent: 55 minutes

## 2021-01-20 ENCOUNTER — APPOINTMENT (OUTPATIENT)
Dept: INTERNAL MEDICINE | Facility: CLINIC | Age: 86
End: 2021-01-20

## 2021-01-20 ENCOUNTER — TRANSCRIPTION ENCOUNTER (OUTPATIENT)
Age: 86
End: 2021-01-20

## 2021-01-20 ENCOUNTER — OUTPATIENT (OUTPATIENT)
Dept: OUTPATIENT SERVICES | Facility: HOSPITAL | Age: 86
LOS: 1 days | End: 2021-01-20
Payer: MEDICARE

## 2021-01-20 DIAGNOSIS — I10 ESSENTIAL (PRIMARY) HYPERTENSION: ICD-10-CM

## 2021-01-20 DIAGNOSIS — Z90.710 ACQUIRED ABSENCE OF BOTH CERVIX AND UTERUS: Chronic | ICD-10-CM

## 2021-01-20 DIAGNOSIS — Z90.49 ACQUIRED ABSENCE OF OTHER SPECIFIED PARTS OF DIGESTIVE TRACT: Chronic | ICD-10-CM

## 2021-01-20 PROBLEM — K21.9 GASTRO-ESOPHAGEAL REFLUX DISEASE WITHOUT ESOPHAGITIS: Chronic | Status: ACTIVE | Noted: 2021-01-16

## 2021-01-20 PROBLEM — E78.5 HYPERLIPIDEMIA, UNSPECIFIED: Chronic | Status: ACTIVE | Noted: 2021-01-16

## 2021-01-20 PROBLEM — M12.9 ARTHROPATHY, UNSPECIFIED: Chronic | Status: ACTIVE | Noted: 2021-01-16

## 2021-01-20 PROCEDURE — G0463: CPT

## 2021-01-20 NOTE — HISTORY OF PRESENT ILLNESS
[Home] : at home, [unfilled] , at the time of the visit. [Other Location: e.g. Home (Enter Location, City,State)___] : at [unfilled] [Family Member] : family member [Verbal consent obtained from patient] : the patient, [unfilled] [FreeTextEntry3] : daughter Daily [FreeTextEntry8] : 86 y/o Mandarin speaking F PMHx HTN, HLD, T2DM, and TIA, called for TEB visit for post-hospitalization follow up.\par \par HIstory obtained from patient's daughter and chart. Patient admitted to [facility] on 1/16 for acute hypoxemic respiratory failure 2/2 COVID. Required 2L NC, started on remdesivir and dexamethasone. Weaned off supplemental O2 and discharged home on 1/19. She is feeling better, no significant SOB/STERLING, cough and fever improved, but still feels generalized weakness. Ambulates with walker at baseline. Living with her daughter, assists in ADLs.

## 2021-01-20 NOTE — ASSESSMENT
[FreeTextEntry1] : 85F PMHx HTN, HLD, T2DM, and TIA, called for TEB for post-hospitalization COVID f/u\par \par #COVID\par On RA, daughter has pulse oximeter and spO2 has been high 90s. Dexamethasone d/c'd early\par - monitor spO2\par \par #HCM\par - daughter to schedule appt for patient for CPE\par \par Time spent on encounter: 25 minutes

## 2021-01-20 NOTE — REVIEW OF SYSTEMS
[Cough] : cough [Muscle Pain] : muscle pain [Fever] : no fever [Chills] : no chills [Sore Throat] : no sore throat [Chest Pain] : no chest pain [Palpitations] : no palpitations [Shortness Of Breath] : no shortness of breath [Dyspnea on Exertion] : no dyspnea on exertion [Abdominal Pain] : no abdominal pain [Vomiting] : no vomiting

## 2021-01-20 NOTE — END OF VISIT
[] : Resident [FreeTextEntry3] : 85F with hypertension, HLD, T2DM, TIA, and GERD presents for hospital follow up as she was admitted to [facility] with hypoxic respiratory failure 2/2 COVID infection and was treated with Remdesivir and Dexamethasone x 4 days. She needed 2L O2 for a short time. Visit completed with patient's daughter who reports that her mom is feeling better. \par \par Her previous doctor is in [place] which is hard to go to given she is in [place]. They would like to see someone closer. She thinks her previous doctor knows her better and they speak Chinese and maybe she would prefer to follow up there. \par \par Daughter reports her appetite is decreased but she is eating well. She checks her blood sugar and O2. FS during the day are 180. Sometimes 230. Never below 75. She checks O2 daily which has been stable above 92% consistently. \par \par Discharge Medications	\par acetaminophen 325 mg PRN\par amLODIPine 5 mg daily\par aspirin 81 mg daily\par atorvastatin 10 mg qhs\par bisoprolol 10 mg daily\par gabapentin: 1000 milligram(s) BID (Pt daughter reports she is on that for one year)\par glipizide-metformin 2.5 mg-500 mg oral tablet BID\par guaifenesin-dextromethorphan PRN\par Janumet 50 mg-1000 mg qHS\par losartan 50 mg daily\par omeprazole 40 mg daily\par \par Total time spent 7 minutes by my self and 20 minutes with resident.  [Time Spent: ___ minutes] : I have spent [unfilled] minutes of time on the encounter.

## 2021-01-21 ENCOUNTER — TRANSCRIPTION ENCOUNTER (OUTPATIENT)
Age: 86
End: 2021-01-21

## 2021-01-21 ENCOUNTER — INPATIENT (INPATIENT)
Facility: HOSPITAL | Age: 86
LOS: 11 days | Discharge: HOME | End: 2021-02-02
Attending: INTERNAL MEDICINE | Admitting: INTERNAL MEDICINE
Payer: MEDICARE

## 2021-01-21 ENCOUNTER — EMERGENCY (EMERGENCY)
Facility: HOSPITAL | Age: 86
LOS: 1 days | Discharge: ANOTHER HOSPITAL | End: 2021-01-21
Attending: EMERGENCY MEDICINE
Payer: MEDICARE

## 2021-01-21 VITALS
HEIGHT: 67 IN | RESPIRATION RATE: 24 BRPM | HEART RATE: 72 BPM | TEMPERATURE: 99 F | SYSTOLIC BLOOD PRESSURE: 116 MMHG | DIASTOLIC BLOOD PRESSURE: 77 MMHG | OXYGEN SATURATION: 91 %

## 2021-01-21 VITALS
RESPIRATION RATE: 17 BRPM | HEART RATE: 71 BPM | SYSTOLIC BLOOD PRESSURE: 149 MMHG | TEMPERATURE: 99 F | OXYGEN SATURATION: 96 % | DIASTOLIC BLOOD PRESSURE: 71 MMHG

## 2021-01-21 VITALS
DIASTOLIC BLOOD PRESSURE: 95 MMHG | HEART RATE: 79 BPM | WEIGHT: 145.51 LBS | RESPIRATION RATE: 18 BRPM | HEIGHT: 63 IN | TEMPERATURE: 98 F | SYSTOLIC BLOOD PRESSURE: 162 MMHG | OXYGEN SATURATION: 95 %

## 2021-01-21 DIAGNOSIS — Z90.710 ACQUIRED ABSENCE OF BOTH CERVIX AND UTERUS: Chronic | ICD-10-CM

## 2021-01-21 DIAGNOSIS — Z90.49 ACQUIRED ABSENCE OF OTHER SPECIFIED PARTS OF DIGESTIVE TRACT: Chronic | ICD-10-CM

## 2021-01-21 DIAGNOSIS — I10 ESSENTIAL (PRIMARY) HYPERTENSION: ICD-10-CM

## 2021-01-21 DIAGNOSIS — E87.70 FLUID OVERLOAD, UNSPECIFIED: ICD-10-CM

## 2021-01-21 DIAGNOSIS — U07.1 COVID-19: ICD-10-CM

## 2021-01-21 DIAGNOSIS — E11.9 TYPE 2 DIABETES MELLITUS WITHOUT COMPLICATIONS: ICD-10-CM

## 2021-01-21 DIAGNOSIS — J96.01 ACUTE RESPIRATORY FAILURE WITH HYPOXIA: ICD-10-CM

## 2021-01-21 LAB
ALBUMIN SERPL ELPH-MCNC: 3.3 G/DL — SIGNIFICANT CHANGE UP (ref 3.3–5)
ALP SERPL-CCNC: 66 U/L — SIGNIFICANT CHANGE UP (ref 40–120)
ALT FLD-CCNC: 32 U/L — SIGNIFICANT CHANGE UP (ref 10–45)
ANION GAP SERPL CALC-SCNC: 12 MMOL/L — SIGNIFICANT CHANGE UP (ref 5–17)
AST SERPL-CCNC: 27 U/L — SIGNIFICANT CHANGE UP (ref 10–40)
BASOPHILS # BLD AUTO: 0.01 K/UL — SIGNIFICANT CHANGE UP (ref 0–0.2)
BASOPHILS NFR BLD AUTO: 0.1 % — SIGNIFICANT CHANGE UP (ref 0–2)
BILIRUB SERPL-MCNC: 1.4 MG/DL — HIGH (ref 0.2–1.2)
BUN SERPL-MCNC: 16 MG/DL — SIGNIFICANT CHANGE UP (ref 7–23)
CALCIUM SERPL-MCNC: 9.1 MG/DL — SIGNIFICANT CHANGE UP (ref 8.4–10.5)
CHLORIDE SERPL-SCNC: 94 MMOL/L — LOW (ref 96–108)
CO2 SERPL-SCNC: 24 MMOL/L — SIGNIFICANT CHANGE UP (ref 22–31)
CREAT SERPL-MCNC: 0.93 MG/DL — SIGNIFICANT CHANGE UP (ref 0.5–1.3)
EOSINOPHIL # BLD AUTO: 0.04 K/UL — SIGNIFICANT CHANGE UP (ref 0–0.5)
EOSINOPHIL NFR BLD AUTO: 0.6 % — SIGNIFICANT CHANGE UP (ref 0–6)
GLUCOSE BLDC GLUCOMTR-MCNC: 188 MG/DL — HIGH (ref 70–99)
GLUCOSE SERPL-MCNC: 237 MG/DL — HIGH (ref 70–99)
HCT VFR BLD CALC: 41.5 % — SIGNIFICANT CHANGE UP (ref 34.5–45)
HGB BLD-MCNC: 14.3 G/DL — SIGNIFICANT CHANGE UP (ref 11.5–15.5)
IMM GRANULOCYTES NFR BLD AUTO: 1.4 % — SIGNIFICANT CHANGE UP (ref 0–1.5)
LYMPHOCYTES # BLD AUTO: 0.71 K/UL — LOW (ref 1–3.3)
LYMPHOCYTES # BLD AUTO: 10.2 % — LOW (ref 13–44)
MCHC RBC-ENTMCNC: 30.4 PG — SIGNIFICANT CHANGE UP (ref 27–34)
MCHC RBC-ENTMCNC: 34.5 GM/DL — SIGNIFICANT CHANGE UP (ref 32–36)
MCV RBC AUTO: 88.1 FL — SIGNIFICANT CHANGE UP (ref 80–100)
MONOCYTES # BLD AUTO: 0.33 K/UL — SIGNIFICANT CHANGE UP (ref 0–0.9)
MONOCYTES NFR BLD AUTO: 4.7 % — SIGNIFICANT CHANGE UP (ref 2–14)
NEUTROPHILS # BLD AUTO: 5.79 K/UL — SIGNIFICANT CHANGE UP (ref 1.8–7.4)
NEUTROPHILS NFR BLD AUTO: 83 % — HIGH (ref 43–77)
NRBC # BLD: 0 /100 WBCS — SIGNIFICANT CHANGE UP (ref 0–0)
PLATELET # BLD AUTO: 198 K/UL — SIGNIFICANT CHANGE UP (ref 150–400)
POTASSIUM SERPL-MCNC: 4.2 MMOL/L — SIGNIFICANT CHANGE UP (ref 3.5–5.3)
POTASSIUM SERPL-SCNC: 4.2 MMOL/L — SIGNIFICANT CHANGE UP (ref 3.5–5.3)
PROT SERPL-MCNC: 6.3 G/DL — SIGNIFICANT CHANGE UP (ref 6–8.3)
RBC # BLD: 4.71 M/UL — SIGNIFICANT CHANGE UP (ref 3.8–5.2)
RBC # FLD: 12 % — SIGNIFICANT CHANGE UP (ref 10.3–14.5)
SARS-COV-2 RNA SPEC QL NAA+PROBE: DETECTED
SODIUM SERPL-SCNC: 130 MMOL/L — LOW (ref 135–145)
WBC # BLD: 6.98 K/UL — SIGNIFICANT CHANGE UP (ref 3.8–10.5)
WBC # FLD AUTO: 6.98 K/UL — SIGNIFICANT CHANGE UP (ref 3.8–10.5)

## 2021-01-21 PROCEDURE — 82962 GLUCOSE BLOOD TEST: CPT

## 2021-01-21 PROCEDURE — 99223 1ST HOSP IP/OBS HIGH 75: CPT | Mod: CS

## 2021-01-21 PROCEDURE — 99285 EMERGENCY DEPT VISIT HI MDM: CPT | Mod: CS

## 2021-01-21 PROCEDURE — 80053 COMPREHEN METABOLIC PANEL: CPT

## 2021-01-21 PROCEDURE — U0005: CPT

## 2021-01-21 PROCEDURE — 93005 ELECTROCARDIOGRAM TRACING: CPT

## 2021-01-21 PROCEDURE — 93010 ELECTROCARDIOGRAM REPORT: CPT

## 2021-01-21 PROCEDURE — 71045 X-RAY EXAM CHEST 1 VIEW: CPT | Mod: 26

## 2021-01-21 PROCEDURE — U0003: CPT

## 2021-01-21 PROCEDURE — 85025 COMPLETE CBC W/AUTO DIFF WBC: CPT

## 2021-01-21 PROCEDURE — 99285 EMERGENCY DEPT VISIT HI MDM: CPT | Mod: 25

## 2021-01-21 PROCEDURE — 71045 X-RAY EXAM CHEST 1 VIEW: CPT

## 2021-01-21 RX ORDER — ASPIRIN/CALCIUM CARB/MAGNESIUM 324 MG
81 TABLET ORAL DAILY
Refills: 0 | Status: DISCONTINUED | OUTPATIENT
Start: 2021-01-21 | End: 2021-02-02

## 2021-01-21 RX ORDER — SITAGLIPTIN AND METFORMIN HYDROCHLORIDE 500; 50 MG/1; MG/1
1 TABLET, FILM COATED ORAL
Qty: 0 | Refills: 0 | DISCHARGE

## 2021-01-21 RX ORDER — SODIUM CHLORIDE 9 MG/ML
1000 INJECTION, SOLUTION INTRAVENOUS
Refills: 0 | Status: DISCONTINUED | OUTPATIENT
Start: 2021-01-21 | End: 2021-02-02

## 2021-01-21 RX ORDER — BISOPROLOL FUMARATE 10 MG/1
1 TABLET, FILM COATED ORAL
Qty: 0 | Refills: 0 | DISCHARGE

## 2021-01-21 RX ORDER — INSULIN LISPRO 100/ML
VIAL (ML) SUBCUTANEOUS
Refills: 0 | Status: DISCONTINUED | OUTPATIENT
Start: 2021-01-21 | End: 2021-02-02

## 2021-01-21 RX ORDER — AMLODIPINE BESYLATE 2.5 MG/1
5 TABLET ORAL DAILY
Refills: 0 | Status: DISCONTINUED | OUTPATIENT
Start: 2021-01-21 | End: 2021-02-02

## 2021-01-21 RX ORDER — DEXTROSE 50 % IN WATER 50 %
12.5 SYRINGE (ML) INTRAVENOUS ONCE
Refills: 0 | Status: DISCONTINUED | OUTPATIENT
Start: 2021-01-21 | End: 2021-02-02

## 2021-01-21 RX ORDER — ASPIRIN/CALCIUM CARB/MAGNESIUM 324 MG
1 TABLET ORAL
Qty: 0 | Refills: 0 | DISCHARGE

## 2021-01-21 RX ORDER — LOSARTAN POTASSIUM 100 MG/1
50 TABLET, FILM COATED ORAL DAILY
Refills: 0 | Status: DISCONTINUED | OUTPATIENT
Start: 2021-01-21 | End: 2021-02-02

## 2021-01-21 RX ORDER — ATORVASTATIN CALCIUM 80 MG/1
1 TABLET, FILM COATED ORAL
Qty: 0 | Refills: 0 | DISCHARGE

## 2021-01-21 RX ORDER — PANTOPRAZOLE SODIUM 20 MG/1
40 TABLET, DELAYED RELEASE ORAL
Refills: 0 | Status: DISCONTINUED | OUTPATIENT
Start: 2021-01-21 | End: 2021-02-02

## 2021-01-21 RX ORDER — GABAPENTIN 400 MG/1
1000 CAPSULE ORAL
Qty: 0 | Refills: 0 | DISCHARGE

## 2021-01-21 RX ORDER — DEXTROSE 50 % IN WATER 50 %
25 SYRINGE (ML) INTRAVENOUS ONCE
Refills: 0 | Status: DISCONTINUED | OUTPATIENT
Start: 2021-01-21 | End: 2021-02-02

## 2021-01-21 RX ORDER — GLUCAGON INJECTION, SOLUTION 0.5 MG/.1ML
1 INJECTION, SOLUTION SUBCUTANEOUS ONCE
Refills: 0 | Status: DISCONTINUED | OUTPATIENT
Start: 2021-01-21 | End: 2021-02-02

## 2021-01-21 RX ORDER — INSULIN LISPRO 100/ML
VIAL (ML) SUBCUTANEOUS AT BEDTIME
Refills: 0 | Status: DISCONTINUED | OUTPATIENT
Start: 2021-01-21 | End: 2021-02-02

## 2021-01-21 RX ORDER — DEXTROSE 50 % IN WATER 50 %
15 SYRINGE (ML) INTRAVENOUS ONCE
Refills: 0 | Status: DISCONTINUED | OUTPATIENT
Start: 2021-01-21 | End: 2021-02-02

## 2021-01-21 RX ORDER — OMEPRAZOLE 10 MG/1
1 CAPSULE, DELAYED RELEASE ORAL
Qty: 0 | Refills: 0 | DISCHARGE

## 2021-01-21 RX ORDER — ACETAMINOPHEN 500 MG
975 TABLET ORAL ONCE
Refills: 0 | Status: COMPLETED | OUTPATIENT
Start: 2021-01-21 | End: 2021-01-21

## 2021-01-21 RX ORDER — ENOXAPARIN SODIUM 100 MG/ML
40 INJECTION SUBCUTANEOUS DAILY
Refills: 0 | Status: DISCONTINUED | OUTPATIENT
Start: 2021-01-21 | End: 2021-02-02

## 2021-01-21 RX ORDER — GLIPIZIDE/METFORMIN HCL 2.5-500 MG
1 TABLET ORAL
Qty: 0 | Refills: 0 | DISCHARGE

## 2021-01-21 RX ORDER — ATORVASTATIN CALCIUM 80 MG/1
10 TABLET, FILM COATED ORAL AT BEDTIME
Refills: 0 | Status: DISCONTINUED | OUTPATIENT
Start: 2021-01-21 | End: 2021-02-02

## 2021-01-21 RX ORDER — SODIUM CHLORIDE 9 MG/ML
1000 INJECTION, SOLUTION INTRAVENOUS ONCE
Refills: 0 | Status: COMPLETED | OUTPATIENT
Start: 2021-01-21 | End: 2021-01-21

## 2021-01-21 RX ORDER — LOSARTAN POTASSIUM 100 MG/1
1 TABLET, FILM COATED ORAL
Qty: 0 | Refills: 0 | DISCHARGE

## 2021-01-21 RX ADMIN — Medication 975 MILLIGRAM(S): at 15:33

## 2021-01-21 RX ADMIN — SODIUM CHLORIDE 1000 MILLILITER(S): 9 INJECTION, SOLUTION INTRAVENOUS at 15:33

## 2021-01-21 NOTE — ED ADULT NURSE REASSESSMENT NOTE - NS ED NURSE REASSESS COMMENT FT1
Pt resting comfortably, placed on bedpan, repositioned and comfort care provided, no complaints at this time. Call bell within reach. Will continue to monitor.

## 2021-01-21 NOTE — H&P ADULT - ASSESSMENT
85 Mandarin Speaking F PMH HTN, HLD, DM2, CVA/TIA 2020 w/ full resolution of symptoms in 3 days (confused, no speech or limb paralysis at [facility]) tx from [facility] w/ hypoxia 2/2 COVID-19.

## 2021-01-21 NOTE — ED PROVIDER NOTE - ATTENDING CONTRIBUTION TO CARE
Patient is an 84 yo F with history of DM, HTN, dyslipidemia, arthropathies, diagnosed with COVID19 on 1/16/21 s/p hospitalization, remdesivir and decadron, discharged on 1/19/21 sent in for O2 sat of 89% on RA at home and complaint of weakness. Patient has myalgias but denies chest pain, shortness of breath, abdominal pain. She reports decreased appetite and fatigue. She states she has had these symptoms for 2 weeks. She was able to have some soup/ mild this afternoon.    VS noted - RA 89-92%  Gen. no acute distress, Non toxic, fatigued  HEENT: EOMI, mmm  Lungs: CTAB/L no C/ W /R   CVS: RRR   Abd; Soft non tender, non distended   Ext: no edema  Skin: no rash  Neuro AAOx3 non focal clear speech  a/p: myalgias/ hypoxia - O2 varies from 89% - 92% without any increased work of breathing. Will check labs for dehydration/ SAI, give tylenol and IVF and reassess.   - Lore PARISH

## 2021-01-21 NOTE — H&P ADULT - NSHPPHYSICALEXAM_GEN_ALL_CORE
T(C): 36.6 (01-21-21 @ 21:25), Max: 37.9 (01-21-21 @ 14:56)  HR: 79 (01-21-21 @ 21:25) (70 - 79)  BP: 162/95 (01-21-21 @ 21:25) (116/77 - 162/95)  RR: 18 (01-21-21 @ 21:25) (17 - 24)  SpO2: 95% (01-21-21 @ 21:25) (88% - 96%)    Constitutional: NAD, well-developed, well-nourished  Ears, Nose, Mouth, and Throat: normal external ears and nose, normal hearing, moist oral mucosa  Eyes: normal conjunctiva, EOMI, PERRL  Neck: supple, no JVD  Respiratory: Clear to auscultation bilaterally. No wheezes, rales or rhonchi. Normal respiratory effort  Cardiovascular: RRR, no M/R/G, no edema, 2+ Peripheral Pulses  Gastrointestinal: soft, nontender, nondistended, +BS, no hernia  Skin: warm, dry, no rash  Neurologic: sensation grossly intact, CN grossly intact, non-focal exam  Musculoskeletal: no clubbing, no cyanosis, no joint swelling  Psychiatric: AOX3, appropriate mood, affect

## 2021-01-21 NOTE — ED ADULT NURSE NOTE - OBJECTIVE STATEMENT
85 y.o F A&Ox3 with PMH of arthropathies, GERD, dyslipidemia, HTN, DM, presents to the ED c.o low spo2 at home, body aches, chills, weakness, loss of appetite and cough. Pt. reports she was admitted to [facility] a couple days ago after testing positive for covid-19. Pt. states she was discharged yesterday and was feeling unwell today which prompted her to come to the ED. States she her oxygen saturation was 89% at home. Pt. reports she has been experiencing body aches, chills, and weakness. Pt. sat ranging from 91-94% on RA upon initial assessment. Pt. does not appear to be in any acute distress at this time - nonlabored breathing noted. Rectal temp of 100.3. Denies taking any medication prior to arrival. Pt. placed on CM. EKG done. IV access obtained. Safety and comfort provided.

## 2021-01-21 NOTE — ED ADULT NURSE NOTE - NSIMPLEMENTINTERV_GEN_ALL_ED
Implemented All Fall Risk Interventions:  [place] to call system. Call bell, personal items and telephone within reach. Instruct patient to call for assistance. Room bathroom lighting operational. Non-slip footwear when patient is off stretcher. Physically safe environment: no spills, clutter or unnecessary equipment. Stretcher in lowest position, wheels locked, appropriate side rails in place. Provide visual cue, wrist band, yellow gown, etc. Monitor gait and stability. Monitor for mental status changes and reorient to person, place, and time. Review medications for side effects contributing to fall risk. Reinforce activity limits and safety measures with patient and family.

## 2021-01-21 NOTE — H&P ADULT - HISTORY OF PRESENT ILLNESS
Patient is an 86 y/o Mandarin Speaking F PMH HTN, HLD, DM2, CVA/TIA 2020 w/ full resolution of symptoms in 3 days (confused, no speech or limb paralysis at [facility]) tx from [facility] w/ hypoxia 2/2 COVID-19.  Hospitalized at [facility] 1/16-/19 for COVID-19, presented there w/ fever, cough, malaise, anorexia w/ hypxia, received decadron and remdesivir, weaned off O2, discharged home.  Returned to [facility] on 1/21 w/ hypoxia to 91%, found to be as low as 88% on RA at [facility].  Currently denying any symptoms.

## 2021-01-21 NOTE — ED PROVIDER NOTE - OBJECTIVE STATEMENT
85 year old female with a pmhx of DM, HTN, GERD p/w hypoxia. pt reports home O2 reading as low as 91%. pt was admitted to [facility] for hypoxia secondary to COVID-19 infection between 1/16 and 1/19, was weened off O2 with no home O2 delivery. pt c/o generalized fatigue, body aches and decreased appetite but is tolerating PO. tolerated soup/milk this afternoon without nausea/vomiting. Denies CP, SOB, dizziness, LOC, neck stiffness, abdominal pain, leg swelling/pain, NVDC, or urinary symptoms.

## 2021-01-21 NOTE — ED PROVIDER NOTE - CLINICAL SUMMARY MEDICAL DECISION MAKING FREE TEXT BOX
85 year old female with a PMHx of DM, HTN, GERD p/w hypoxia as low as 91% on RA at home. in ED pt c/o malaise, body aches, chills and dec appetite. tolerating PO. febrile in ED with lowest O2 91% on RA. no respiratory distress. +Scattered rhonchi in otherwise well appearing pt. AOx4. ddx covid-19 (confirmed 2 days ago) vs focal PNA. low suspicion for PE. will check CBC/CMP, give antipyretics and IVF, CXR, reassess, possibly consult SW for home O2 if pt becomes hypoxic blow 90%. unlikely to require admission - Paxton Sandra PA-C

## 2021-01-21 NOTE — ED PROVIDER NOTE - MUSCULOSKELETAL, MLM
Spine appears normal, range of motion is not limited, no muscle or joint tenderness. Calves equal in size

## 2021-01-21 NOTE — H&P ADULT - NSHPLABSRESULTS_GEN_ALL_CORE
01-21    130<L>  |  94<L>  |  16  ----------------------------<  237<H>  4.2   |  24  |  0.93    Ca    9.1      21 Jan 2021 15:57    TPro  6.3  /  Alb  3.3  /  TBili  1.4<H>  /  DBili  x   /  AST  27  /  ALT  32  /  AlkPhos  66  01-21                            14.3   6.98  )-----------( 198      ( 21 Jan 2021 15:57 )             41.5         LIVER FUNCTIONS - ( 21 Jan 2021 15:57 )  Alb: 3.3 g/dL / Pro: 6.3 g/dL / ALK PHOS: 66 U/L / ALT: 32 U/L / AST: 27 U/L / GGT: x

## 2021-01-21 NOTE — H&P ADULT - PROBLEM SELECTOR PLAN 2
O2 as above, received decadron, remdesivir during [place] hospitalization, supportive care, set up O2 for home use O2 as above, received decadron, remdesivir during [place] hospitalization, supportive care, set up O2 for home use.  DNR/DNI, MOLST in chart

## 2021-01-21 NOTE — ED ADULT NURSE REASSESSMENT NOTE - NS ED NURSE REASSESS COMMENT FT1
Report given to receiving RN at [facility] bed [phone] - report given to Cammy CABRAL. Awaiting ambulance service for transfer at this time.

## 2021-01-21 NOTE — ED PROVIDER NOTE - PROGRESS NOTE DETAILS
case d/w Sharon from social work in effort to arrange home O2. pt does not meet criteria as pt must have PO2 <88% on RA. lowest noted PO2 in ED on monitor is 89%, otherwise resting at 92-94% on RA - Paxton Sandra PA-C pts O2 dropped to 88% on RA, increased to 92-94% on 2L NC. plan discussed with family who expressed concerns that she has not been able to take care of herself. given O2 requirement and failure to thrive will admit. transfer process started in [facility] - Paxton Sandra PA-C This patient meets criteria for severe covid-19 illness and requires hospitalization. In accordance with the current institutional initiative for covid hospitalization load balancing, this patient will be transferred to be admitted to one of our sister facilities for ongoing treatment. The patient is stable and appropriate for transfer and was informed. Patient to be transferred to [facility] with subsequent admission to floor accepting physician Dr. Chaitanya Sandra PA-C

## 2021-01-21 NOTE — ED PROVIDER NOTE - CARE PLAN
Principal Discharge DX:	Hypoxia  Secondary Diagnosis:	COVID-19  Secondary Diagnosis:	Failure to thrive in adult

## 2021-01-21 NOTE — H&P ADULT - NSICDXPASTMEDICALHX_GEN_ALL_CORE_FT
PAST MEDICAL HISTORY:  Arthropathies     Diabetes mellitus     Dyslipidemia     GERD without esophagitis     Hypertension

## 2021-01-21 NOTE — ED ADULT TRIAGE NOTE - CHIEF COMPLAINT QUOTE
covid positive  fever, fatigue, low oxygen saturation at home  per daughter Daily, patient had oxygen saturation of 89% on room air at home

## 2021-01-22 LAB
GLUCOSE BLDC GLUCOMTR-MCNC: 132 MG/DL — HIGH (ref 70–99)
GLUCOSE BLDC GLUCOMTR-MCNC: 182 MG/DL — HIGH (ref 70–99)
GLUCOSE BLDC GLUCOMTR-MCNC: 188 MG/DL — HIGH (ref 70–99)
GLUCOSE BLDC GLUCOMTR-MCNC: 267 MG/DL — HIGH (ref 70–99)

## 2021-01-22 PROCEDURE — 99232 SBSQ HOSP IP/OBS MODERATE 35: CPT | Mod: CS

## 2021-01-22 RX ORDER — SODIUM CHLORIDE 9 MG/ML
1000 INJECTION INTRAMUSCULAR; INTRAVENOUS; SUBCUTANEOUS
Refills: 0 | Status: DISCONTINUED | OUTPATIENT
Start: 2021-01-22 | End: 2021-01-30

## 2021-01-22 RX ADMIN — Medication 6: at 17:48

## 2021-01-22 RX ADMIN — Medication 2: at 12:49

## 2021-01-22 RX ADMIN — PANTOPRAZOLE SODIUM 40 MILLIGRAM(S): 20 TABLET, DELAYED RELEASE ORAL at 04:00

## 2021-01-22 RX ADMIN — ATORVASTATIN CALCIUM 10 MILLIGRAM(S): 80 TABLET, FILM COATED ORAL at 23:26

## 2021-01-22 RX ADMIN — AMLODIPINE BESYLATE 5 MILLIGRAM(S): 2.5 TABLET ORAL at 04:00

## 2021-01-22 RX ADMIN — LOSARTAN POTASSIUM 50 MILLIGRAM(S): 100 TABLET, FILM COATED ORAL at 04:00

## 2021-01-22 RX ADMIN — Medication 2: at 07:38

## 2021-01-22 RX ADMIN — Medication 81 MILLIGRAM(S): at 12:51

## 2021-01-22 RX ADMIN — ENOXAPARIN SODIUM 40 MILLIGRAM(S): 100 INJECTION SUBCUTANEOUS at 12:51

## 2021-01-22 RX ADMIN — SODIUM CHLORIDE 70 MILLILITER(S): 9 INJECTION INTRAMUSCULAR; INTRAVENOUS; SUBCUTANEOUS at 17:48

## 2021-01-22 NOTE — PATIENT PROFILE ADULT - INTERPRETATION SERVICES DECLINED
Received Choice form at 1120  Agency/Facility Name: [facility]  Referral sent per Choice form @ 1120     @1300  Agency/Facility Name: [facility]  Spoke To: Genny  Outcome: Did not receive order, refaxed @ 1300 to secondary fax number.    @2147  Agency/Facility Name: [facility]  Spoke To: Hannah  Outcome: Processing order.    SHAWN Harrington informed.   Patient/Caregiver requests family/friend to interpret.

## 2021-01-22 NOTE — PATIENT PROFILE ADULT - NS PRO AD PATIENT TYPE
PATIENT ARRIVED AMBULATORY TO ROOM C/O SYMPTOMS X3 DAYS. +COUGH. PATIENT STATES \"I STARTED COUGHING SOME STUFF UP YESTERDAY\" NO NASAL CONGESTION. NO FEVERS. NO N/V/D. EASY NON LABORED RESPIRATIONS. Do Not Resuscitate (DNR)

## 2021-01-23 DIAGNOSIS — Z29.9 ENCOUNTER FOR PROPHYLACTIC MEASURES, UNSPECIFIED: ICD-10-CM

## 2021-01-23 LAB
ALBUMIN SERPL ELPH-MCNC: 3 G/DL — LOW (ref 3.3–5)
ALP SERPL-CCNC: 57 U/L — SIGNIFICANT CHANGE UP (ref 40–120)
ALT FLD-CCNC: 13 U/L — SIGNIFICANT CHANGE UP (ref 4–33)
ANION GAP SERPL CALC-SCNC: 10 MMOL/L — SIGNIFICANT CHANGE UP (ref 7–14)
AST SERPL-CCNC: 13 U/L — SIGNIFICANT CHANGE UP (ref 4–32)
BILIRUB SERPL-MCNC: 1.7 MG/DL — HIGH (ref 0.2–1.2)
BUN SERPL-MCNC: 10 MG/DL — SIGNIFICANT CHANGE UP (ref 7–23)
CALCIUM SERPL-MCNC: 8.5 MG/DL — SIGNIFICANT CHANGE UP (ref 8.4–10.5)
CHLORIDE SERPL-SCNC: 99 MMOL/L — SIGNIFICANT CHANGE UP (ref 98–107)
CO2 SERPL-SCNC: 22 MMOL/L — SIGNIFICANT CHANGE UP (ref 22–31)
CREAT SERPL-MCNC: 0.67 MG/DL — SIGNIFICANT CHANGE UP (ref 0.5–1.3)
CRP SERPL-MCNC: 72.1 MG/L — HIGH
D DIMER BLD IA.RAPID-MCNC: 6451 NG/ML DDU — SIGNIFICANT CHANGE UP
FERRITIN SERPL-MCNC: 502 NG/ML — HIGH (ref 15–150)
GLUCOSE BLDC GLUCOMTR-MCNC: 130 MG/DL — HIGH (ref 70–99)
GLUCOSE BLDC GLUCOMTR-MCNC: 146 MG/DL — HIGH (ref 70–99)
GLUCOSE BLDC GLUCOMTR-MCNC: 230 MG/DL — HIGH (ref 70–99)
GLUCOSE BLDC GLUCOMTR-MCNC: 256 MG/DL — HIGH (ref 70–99)
GLUCOSE SERPL-MCNC: 188 MG/DL — HIGH (ref 70–99)
HCT VFR BLD CALC: 39.2 % — SIGNIFICANT CHANGE UP (ref 34.5–45)
HGB BLD-MCNC: 13.2 G/DL — SIGNIFICANT CHANGE UP (ref 11.5–15.5)
MCHC RBC-ENTMCNC: 30.2 PG — SIGNIFICANT CHANGE UP (ref 27–34)
MCHC RBC-ENTMCNC: 33.7 GM/DL — SIGNIFICANT CHANGE UP (ref 32–36)
MCV RBC AUTO: 89.7 FL — SIGNIFICANT CHANGE UP (ref 80–100)
NRBC # BLD: 0 /100 WBCS — SIGNIFICANT CHANGE UP
NRBC # FLD: 0 K/UL — SIGNIFICANT CHANGE UP
PLATELET # BLD AUTO: 202 K/UL — SIGNIFICANT CHANGE UP (ref 150–400)
POTASSIUM SERPL-MCNC: 3.8 MMOL/L — SIGNIFICANT CHANGE UP (ref 3.5–5.3)
POTASSIUM SERPL-SCNC: 3.8 MMOL/L — SIGNIFICANT CHANGE UP (ref 3.5–5.3)
PROT SERPL-MCNC: 5.7 G/DL — LOW (ref 6–8.3)
RBC # BLD: 4.37 M/UL — SIGNIFICANT CHANGE UP (ref 3.8–5.2)
RBC # FLD: 12.1 % — SIGNIFICANT CHANGE UP (ref 10.3–14.5)
SODIUM SERPL-SCNC: 131 MMOL/L — LOW (ref 135–145)
WBC # BLD: 8.58 K/UL — SIGNIFICANT CHANGE UP (ref 3.8–10.5)
WBC # FLD AUTO: 8.58 K/UL — SIGNIFICANT CHANGE UP (ref 3.8–10.5)

## 2021-01-23 PROCEDURE — 99232 SBSQ HOSP IP/OBS MODERATE 35: CPT | Mod: CS

## 2021-01-23 RX ORDER — LANOLIN ALCOHOL/MO/W.PET/CERES
6 CREAM (GRAM) TOPICAL ONCE
Refills: 0 | Status: COMPLETED | OUTPATIENT
Start: 2021-01-23 | End: 2021-01-23

## 2021-01-23 RX ORDER — SODIUM CHLORIDE 9 MG/ML
1000 INJECTION INTRAMUSCULAR; INTRAVENOUS; SUBCUTANEOUS
Refills: 0 | Status: DISCONTINUED | OUTPATIENT
Start: 2021-01-23 | End: 2021-01-30

## 2021-01-23 RX ADMIN — Medication 6: at 12:15

## 2021-01-23 RX ADMIN — PANTOPRAZOLE SODIUM 40 MILLIGRAM(S): 20 TABLET, DELAYED RELEASE ORAL at 07:24

## 2021-01-23 RX ADMIN — ENOXAPARIN SODIUM 40 MILLIGRAM(S): 100 INJECTION SUBCUTANEOUS at 12:22

## 2021-01-23 RX ADMIN — AMLODIPINE BESYLATE 5 MILLIGRAM(S): 2.5 TABLET ORAL at 07:24

## 2021-01-23 RX ADMIN — Medication 6 MILLIGRAM(S): at 22:25

## 2021-01-23 RX ADMIN — Medication 81 MILLIGRAM(S): at 12:22

## 2021-01-23 RX ADMIN — LOSARTAN POTASSIUM 50 MILLIGRAM(S): 100 TABLET, FILM COATED ORAL at 07:24

## 2021-01-23 RX ADMIN — SODIUM CHLORIDE 100 MILLILITER(S): 9 INJECTION INTRAMUSCULAR; INTRAVENOUS; SUBCUTANEOUS at 11:17

## 2021-01-23 RX ADMIN — ATORVASTATIN CALCIUM 10 MILLIGRAM(S): 80 TABLET, FILM COATED ORAL at 22:25

## 2021-01-23 NOTE — PROGRESS NOTE ADULT - PROBLEM SELECTOR PLAN 2
On Decadron,  Got Remdisivir at [facility].  D-dimer went up, will monitor closely

## 2021-01-24 DIAGNOSIS — Z02.9 ENCOUNTER FOR ADMINISTRATIVE EXAMINATIONS, UNSPECIFIED: ICD-10-CM

## 2021-01-24 LAB
ALBUMIN SERPL ELPH-MCNC: 2.8 G/DL — LOW (ref 3.3–5)
ALP SERPL-CCNC: 56 U/L — SIGNIFICANT CHANGE UP (ref 40–120)
ALT FLD-CCNC: 13 U/L — SIGNIFICANT CHANGE UP (ref 4–33)
ANION GAP SERPL CALC-SCNC: 10 MMOL/L — SIGNIFICANT CHANGE UP (ref 7–14)
AST SERPL-CCNC: 13 U/L — SIGNIFICANT CHANGE UP (ref 4–32)
BILIRUB SERPL-MCNC: 1.6 MG/DL — HIGH (ref 0.2–1.2)
BUN SERPL-MCNC: 8 MG/DL — SIGNIFICANT CHANGE UP (ref 7–23)
CALCIUM SERPL-MCNC: 8.5 MG/DL — SIGNIFICANT CHANGE UP (ref 8.4–10.5)
CHLORIDE SERPL-SCNC: 99 MMOL/L — SIGNIFICANT CHANGE UP (ref 98–107)
CO2 SERPL-SCNC: 22 MMOL/L — SIGNIFICANT CHANGE UP (ref 22–31)
CREAT SERPL-MCNC: 0.64 MG/DL — SIGNIFICANT CHANGE UP (ref 0.5–1.3)
D DIMER BLD IA.RAPID-MCNC: 267 NG/ML DDU — HIGH
GLUCOSE BLDC GLUCOMTR-MCNC: 145 MG/DL — HIGH (ref 70–99)
GLUCOSE BLDC GLUCOMTR-MCNC: 171 MG/DL — HIGH (ref 70–99)
GLUCOSE BLDC GLUCOMTR-MCNC: 180 MG/DL — HIGH (ref 70–99)
GLUCOSE BLDC GLUCOMTR-MCNC: 187 MG/DL — HIGH (ref 70–99)
GLUCOSE SERPL-MCNC: 190 MG/DL — HIGH (ref 70–99)
HCT VFR BLD CALC: 37.6 % — SIGNIFICANT CHANGE UP (ref 34.5–45)
HGB BLD-MCNC: 12.6 G/DL — SIGNIFICANT CHANGE UP (ref 11.5–15.5)
MCHC RBC-ENTMCNC: 29.5 PG — SIGNIFICANT CHANGE UP (ref 27–34)
MCHC RBC-ENTMCNC: 33.5 GM/DL — SIGNIFICANT CHANGE UP (ref 32–36)
MCV RBC AUTO: 88.1 FL — SIGNIFICANT CHANGE UP (ref 80–100)
NRBC # BLD: 0 /100 WBCS — SIGNIFICANT CHANGE UP
NRBC # FLD: 0 K/UL — SIGNIFICANT CHANGE UP
PLATELET # BLD AUTO: 199 K/UL — SIGNIFICANT CHANGE UP (ref 150–400)
POTASSIUM SERPL-MCNC: 3.7 MMOL/L — SIGNIFICANT CHANGE UP (ref 3.5–5.3)
POTASSIUM SERPL-SCNC: 3.7 MMOL/L — SIGNIFICANT CHANGE UP (ref 3.5–5.3)
PROT SERPL-MCNC: 5.9 G/DL — LOW (ref 6–8.3)
RBC # BLD: 4.27 M/UL — SIGNIFICANT CHANGE UP (ref 3.8–5.2)
RBC # FLD: 12.4 % — SIGNIFICANT CHANGE UP (ref 10.3–14.5)
SODIUM SERPL-SCNC: 131 MMOL/L — LOW (ref 135–145)
WBC # BLD: 5.58 K/UL — SIGNIFICANT CHANGE UP (ref 3.8–10.5)
WBC # FLD AUTO: 5.58 K/UL — SIGNIFICANT CHANGE UP (ref 3.8–10.5)

## 2021-01-24 PROCEDURE — 99232 SBSQ HOSP IP/OBS MODERATE 35: CPT | Mod: CS

## 2021-01-24 RX ADMIN — AMLODIPINE BESYLATE 5 MILLIGRAM(S): 2.5 TABLET ORAL at 05:11

## 2021-01-24 RX ADMIN — Medication 2: at 11:54

## 2021-01-24 RX ADMIN — Medication 81 MILLIGRAM(S): at 11:55

## 2021-01-24 RX ADMIN — LOSARTAN POTASSIUM 50 MILLIGRAM(S): 100 TABLET, FILM COATED ORAL at 05:11

## 2021-01-24 RX ADMIN — ATORVASTATIN CALCIUM 10 MILLIGRAM(S): 80 TABLET, FILM COATED ORAL at 22:55

## 2021-01-24 RX ADMIN — PANTOPRAZOLE SODIUM 40 MILLIGRAM(S): 20 TABLET, DELAYED RELEASE ORAL at 05:12

## 2021-01-24 RX ADMIN — ENOXAPARIN SODIUM 40 MILLIGRAM(S): 100 INJECTION SUBCUTANEOUS at 11:55

## 2021-01-24 RX ADMIN — Medication 2: at 08:25

## 2021-01-24 NOTE — PHYSICAL THERAPY INITIAL EVALUATION ADULT - PERTINENT HX OF CURRENT PROBLEM, REHAB EVAL
patient presents with (+) COVID 19. PMH includes HTN, HLD, DM2, CVA/TIA 2020 w/ full resolution of symptoms in 3 days (confused, no speech or limb paralysis at [facility]

## 2021-01-24 NOTE — PROGRESS NOTE ADULT - PROBLEM SELECTOR PLAN 6
MARIA E PT karen.  Pt was discharged from [facility] last week and had to be admitted after 2 days so, they want to make sure she is ok going home.Weaned off oxygen today, will watch off .

## 2021-01-25 DIAGNOSIS — E87.1 HYPO-OSMOLALITY AND HYPONATREMIA: ICD-10-CM

## 2021-01-25 DIAGNOSIS — R53.1 WEAKNESS: ICD-10-CM

## 2021-01-25 LAB
ALBUMIN SERPL ELPH-MCNC: 3 G/DL — LOW (ref 3.3–5)
ALP SERPL-CCNC: 60 U/L — SIGNIFICANT CHANGE UP (ref 40–120)
ALT FLD-CCNC: 14 U/L — SIGNIFICANT CHANGE UP (ref 4–33)
ANION GAP SERPL CALC-SCNC: 11 MMOL/L — SIGNIFICANT CHANGE UP (ref 7–14)
AST SERPL-CCNC: 17 U/L — SIGNIFICANT CHANGE UP (ref 4–32)
BILIRUB SERPL-MCNC: 1.8 MG/DL — HIGH (ref 0.2–1.2)
BUN SERPL-MCNC: 7 MG/DL — SIGNIFICANT CHANGE UP (ref 7–23)
CALCIUM SERPL-MCNC: 8.7 MG/DL — SIGNIFICANT CHANGE UP (ref 8.4–10.5)
CHLORIDE SERPL-SCNC: 94 MMOL/L — LOW (ref 98–107)
CO2 SERPL-SCNC: 22 MMOL/L — SIGNIFICANT CHANGE UP (ref 22–31)
CREAT SERPL-MCNC: 0.62 MG/DL — SIGNIFICANT CHANGE UP (ref 0.5–1.3)
GLUCOSE BLDC GLUCOMTR-MCNC: 165 MG/DL — HIGH (ref 70–99)
GLUCOSE BLDC GLUCOMTR-MCNC: 167 MG/DL — HIGH (ref 70–99)
GLUCOSE BLDC GLUCOMTR-MCNC: 184 MG/DL — HIGH (ref 70–99)
GLUCOSE BLDC GLUCOMTR-MCNC: 205 MG/DL — HIGH (ref 70–99)
GLUCOSE SERPL-MCNC: 168 MG/DL — HIGH (ref 70–99)
HCT VFR BLD CALC: 37.7 % — SIGNIFICANT CHANGE UP (ref 34.5–45)
HGB BLD-MCNC: 13 G/DL — SIGNIFICANT CHANGE UP (ref 11.5–15.5)
MCHC RBC-ENTMCNC: 30.1 PG — SIGNIFICANT CHANGE UP (ref 27–34)
MCHC RBC-ENTMCNC: 34.5 GM/DL — SIGNIFICANT CHANGE UP (ref 32–36)
MCV RBC AUTO: 87.3 FL — SIGNIFICANT CHANGE UP (ref 80–100)
NRBC # BLD: 0 /100 WBCS — SIGNIFICANT CHANGE UP
NRBC # FLD: 0 K/UL — SIGNIFICANT CHANGE UP
PLATELET # BLD AUTO: 208 K/UL — SIGNIFICANT CHANGE UP (ref 150–400)
POTASSIUM SERPL-MCNC: 3.4 MMOL/L — LOW (ref 3.5–5.3)
POTASSIUM SERPL-SCNC: 3.4 MMOL/L — LOW (ref 3.5–5.3)
PROT SERPL-MCNC: 6.1 G/DL — SIGNIFICANT CHANGE UP (ref 6–8.3)
RBC # BLD: 4.32 M/UL — SIGNIFICANT CHANGE UP (ref 3.8–5.2)
RBC # FLD: 11.9 % — SIGNIFICANT CHANGE UP (ref 10.3–14.5)
SODIUM SERPL-SCNC: 127 MMOL/L — LOW (ref 135–145)
WBC # BLD: 7.98 K/UL — SIGNIFICANT CHANGE UP (ref 3.8–10.5)
WBC # FLD AUTO: 7.98 K/UL — SIGNIFICANT CHANGE UP (ref 3.8–10.5)

## 2021-01-25 PROCEDURE — 99233 SBSQ HOSP IP/OBS HIGH 50: CPT

## 2021-01-25 RX ORDER — ACETAMINOPHEN 500 MG
650 TABLET ORAL EVERY 6 HOURS
Refills: 0 | Status: DISCONTINUED | OUTPATIENT
Start: 2021-01-25 | End: 2021-02-02

## 2021-01-25 RX ORDER — POTASSIUM CHLORIDE 20 MEQ
20 PACKET (EA) ORAL
Refills: 0 | Status: COMPLETED | OUTPATIENT
Start: 2021-01-25 | End: 2021-01-25

## 2021-01-25 RX ADMIN — Medication 2: at 17:32

## 2021-01-25 RX ADMIN — LOSARTAN POTASSIUM 50 MILLIGRAM(S): 100 TABLET, FILM COATED ORAL at 06:32

## 2021-01-25 RX ADMIN — Medication 2: at 07:52

## 2021-01-25 RX ADMIN — Medication 650 MILLIGRAM(S): at 22:03

## 2021-01-25 RX ADMIN — AMLODIPINE BESYLATE 5 MILLIGRAM(S): 2.5 TABLET ORAL at 06:32

## 2021-01-25 RX ADMIN — PANTOPRAZOLE SODIUM 40 MILLIGRAM(S): 20 TABLET, DELAYED RELEASE ORAL at 06:32

## 2021-01-25 RX ADMIN — Medication 2: at 13:38

## 2021-01-25 RX ADMIN — ATORVASTATIN CALCIUM 10 MILLIGRAM(S): 80 TABLET, FILM COATED ORAL at 22:03

## 2021-01-25 RX ADMIN — Medication 20 MILLIEQUIVALENT(S): at 17:31

## 2021-01-25 RX ADMIN — Medication 20 MILLIEQUIVALENT(S): at 17:04

## 2021-01-25 RX ADMIN — ENOXAPARIN SODIUM 40 MILLIGRAM(S): 100 INJECTION SUBCUTANEOUS at 11:23

## 2021-01-25 RX ADMIN — Medication 81 MILLIGRAM(S): at 11:23

## 2021-01-25 NOTE — PROGRESS NOTE ADULT - PROBLEM SELECTOR PLAN 3
On Decadron,  Got Remdisivir at [facility].  D-dimer went down sec to Pneumonia due to COVID.  weaned off oxygen.  will watch off

## 2021-01-25 NOTE — PROGRESS NOTE ADULT - PROBLEM SELECTOR PLAN 7
MARIA E PT karen.  Pt was discharged from [facility] last week and had to be admitted after 2 days so, they want to make sure she is ok going home.Weaned off oxygen today, will watch off . DVT prophylaxis-on lovenox

## 2021-01-25 NOTE — DIETITIAN INITIAL EVALUATION ADULT. - OTHER INFO
Unable to conduct a face to face interview or nutrition-focused physical exam due to limited contact restrictions related to Pt's medical condition and isolation precautions. Collateral information was obtained from chart review. Unable to assess PO intake although adequate nutrition was noted in flow sheets. No GI distress (nausea/vomiting/diarrhea/constipation) was noted per chart. No chewing or swallowing difficulties at this time as per chart. Unable to assess wt history.    Pt has of T2DM. Pt would benefit from Glucerna Shake supplements.

## 2021-01-25 NOTE — PROGRESS NOTE ADULT - PROBLEM SELECTOR PLAN 4
FS AC/QHS w/ sliding scale On Decadron,  Got Remdisivir at [facility].  D-dimer went down

## 2021-01-25 NOTE — PROGRESS NOTE ADULT - PROBLEM SELECTOR PLAN 2
sec to Pneumonia due to COVID.  weaned off oxygen.  will watch off water restriction 1000 cc    sodium osmolality in plasma   urine osmolality

## 2021-01-26 LAB
ALBUMIN SERPL ELPH-MCNC: 3.2 G/DL — LOW (ref 3.3–5)
ALP SERPL-CCNC: 65 U/L — SIGNIFICANT CHANGE UP (ref 40–120)
ALT FLD-CCNC: 18 U/L — SIGNIFICANT CHANGE UP (ref 4–33)
ANION GAP SERPL CALC-SCNC: 10 MMOL/L — SIGNIFICANT CHANGE UP (ref 7–14)
AST SERPL-CCNC: 24 U/L — SIGNIFICANT CHANGE UP (ref 4–32)
BILIRUB SERPL-MCNC: 1.6 MG/DL — HIGH (ref 0.2–1.2)
BUN SERPL-MCNC: 14 MG/DL — SIGNIFICANT CHANGE UP (ref 7–23)
CALCIUM SERPL-MCNC: 8.4 MG/DL — SIGNIFICANT CHANGE UP (ref 8.4–10.5)
CHLORIDE SERPL-SCNC: 94 MMOL/L — LOW (ref 98–107)
CO2 SERPL-SCNC: 24 MMOL/L — SIGNIFICANT CHANGE UP (ref 22–31)
CREAT SERPL-MCNC: 0.79 MG/DL — SIGNIFICANT CHANGE UP (ref 0.5–1.3)
GLUCOSE BLDC GLUCOMTR-MCNC: 159 MG/DL — HIGH (ref 70–99)
GLUCOSE BLDC GLUCOMTR-MCNC: 163 MG/DL — HIGH (ref 70–99)
GLUCOSE BLDC GLUCOMTR-MCNC: 183 MG/DL — HIGH (ref 70–99)
GLUCOSE BLDC GLUCOMTR-MCNC: 215 MG/DL — HIGH (ref 70–99)
GLUCOSE SERPL-MCNC: 204 MG/DL — HIGH (ref 70–99)
HCT VFR BLD CALC: 36.8 % — SIGNIFICANT CHANGE UP (ref 34.5–45)
HGB BLD-MCNC: 12.5 G/DL — SIGNIFICANT CHANGE UP (ref 11.5–15.5)
MAGNESIUM SERPL-MCNC: 1.9 MG/DL — SIGNIFICANT CHANGE UP (ref 1.6–2.6)
MCHC RBC-ENTMCNC: 29.8 PG — SIGNIFICANT CHANGE UP (ref 27–34)
MCHC RBC-ENTMCNC: 34 GM/DL — SIGNIFICANT CHANGE UP (ref 32–36)
MCV RBC AUTO: 87.8 FL — SIGNIFICANT CHANGE UP (ref 80–100)
NRBC # BLD: 0 /100 WBCS — SIGNIFICANT CHANGE UP
NRBC # FLD: 0 K/UL — SIGNIFICANT CHANGE UP
PHOSPHATE SERPL-MCNC: 2.4 MG/DL — LOW (ref 2.5–4.5)
PLATELET # BLD AUTO: 200 K/UL — SIGNIFICANT CHANGE UP (ref 150–400)
POTASSIUM SERPL-MCNC: 4 MMOL/L — SIGNIFICANT CHANGE UP (ref 3.5–5.3)
POTASSIUM SERPL-SCNC: 4 MMOL/L — SIGNIFICANT CHANGE UP (ref 3.5–5.3)
PROT SERPL-MCNC: 6.3 G/DL — SIGNIFICANT CHANGE UP (ref 6–8.3)
RBC # BLD: 4.19 M/UL — SIGNIFICANT CHANGE UP (ref 3.8–5.2)
RBC # FLD: 12.3 % — SIGNIFICANT CHANGE UP (ref 10.3–14.5)
SODIUM SERPL-SCNC: 128 MMOL/L — LOW (ref 135–145)
WBC # BLD: 8.95 K/UL — SIGNIFICANT CHANGE UP (ref 3.8–10.5)
WBC # FLD AUTO: 8.95 K/UL — SIGNIFICANT CHANGE UP (ref 3.8–10.5)

## 2021-01-26 PROCEDURE — 99233 SBSQ HOSP IP/OBS HIGH 50: CPT

## 2021-01-26 PROCEDURE — 71045 X-RAY EXAM CHEST 1 VIEW: CPT | Mod: 26

## 2021-01-26 PROCEDURE — 93970 EXTREMITY STUDY: CPT | Mod: 26

## 2021-01-26 RX ORDER — LANOLIN ALCOHOL/MO/W.PET/CERES
3 CREAM (GRAM) TOPICAL AT BEDTIME
Refills: 0 | Status: DISCONTINUED | OUTPATIENT
Start: 2021-01-26 | End: 2021-02-02

## 2021-01-26 RX ADMIN — ATORVASTATIN CALCIUM 10 MILLIGRAM(S): 80 TABLET, FILM COATED ORAL at 21:35

## 2021-01-26 RX ADMIN — Medication 2: at 12:18

## 2021-01-26 RX ADMIN — Medication 2: at 17:31

## 2021-01-26 RX ADMIN — Medication 81 MILLIGRAM(S): at 12:18

## 2021-01-26 RX ADMIN — PANTOPRAZOLE SODIUM 40 MILLIGRAM(S): 20 TABLET, DELAYED RELEASE ORAL at 06:26

## 2021-01-26 RX ADMIN — Medication 2: at 08:04

## 2021-01-26 RX ADMIN — ENOXAPARIN SODIUM 40 MILLIGRAM(S): 100 INJECTION SUBCUTANEOUS at 12:18

## 2021-01-26 RX ADMIN — Medication 3 MILLIGRAM(S): at 21:35

## 2021-01-27 DIAGNOSIS — K59.00 CONSTIPATION, UNSPECIFIED: ICD-10-CM

## 2021-01-27 LAB
ALBUMIN SERPL ELPH-MCNC: 3.1 G/DL — LOW (ref 3.3–5)
ALP SERPL-CCNC: 66 U/L — SIGNIFICANT CHANGE UP (ref 40–120)
ALT FLD-CCNC: 18 U/L — SIGNIFICANT CHANGE UP (ref 4–33)
ANION GAP SERPL CALC-SCNC: 14 MMOL/L — SIGNIFICANT CHANGE UP (ref 7–14)
APPEARANCE UR: CLEAR — SIGNIFICANT CHANGE UP
AST SERPL-CCNC: 21 U/L — SIGNIFICANT CHANGE UP (ref 4–32)
BACTERIA # UR AUTO: NEGATIVE — SIGNIFICANT CHANGE UP
BILIRUB SERPL-MCNC: 1.7 MG/DL — HIGH (ref 0.2–1.2)
BILIRUB UR-MCNC: NEGATIVE — SIGNIFICANT CHANGE UP
BUN SERPL-MCNC: 10 MG/DL — SIGNIFICANT CHANGE UP (ref 7–23)
CALCIUM SERPL-MCNC: 8.3 MG/DL — LOW (ref 8.4–10.5)
CHLORIDE SERPL-SCNC: 93 MMOL/L — LOW (ref 98–107)
CO2 SERPL-SCNC: 20 MMOL/L — LOW (ref 22–31)
COLOR SPEC: SIGNIFICANT CHANGE UP
CREAT SERPL-MCNC: 0.74 MG/DL — SIGNIFICANT CHANGE UP (ref 0.5–1.3)
CRP SERPL-MCNC: 80.1 MG/L — HIGH
D DIMER BLD IA.RAPID-MCNC: 719 NG/ML DDU — HIGH
DIFF PNL FLD: ABNORMAL
EPI CELLS # UR: 0 /HPF — SIGNIFICANT CHANGE UP (ref 0–5)
FERRITIN SERPL-MCNC: 549 NG/ML — HIGH (ref 15–150)
GLUCOSE BLDC GLUCOMTR-MCNC: 191 MG/DL — HIGH (ref 70–99)
GLUCOSE BLDC GLUCOMTR-MCNC: 200 MG/DL — HIGH (ref 70–99)
GLUCOSE BLDC GLUCOMTR-MCNC: 247 MG/DL — HIGH (ref 70–99)
GLUCOSE BLDC GLUCOMTR-MCNC: 255 MG/DL — HIGH (ref 70–99)
GLUCOSE SERPL-MCNC: 193 MG/DL — HIGH (ref 70–99)
GLUCOSE UR QL: ABNORMAL
HCT VFR BLD CALC: 35.9 % — SIGNIFICANT CHANGE UP (ref 34.5–45)
HGB BLD-MCNC: 12.5 G/DL — SIGNIFICANT CHANGE UP (ref 11.5–15.5)
KETONES UR-MCNC: NEGATIVE — SIGNIFICANT CHANGE UP
LEUKOCYTE ESTERASE UR-ACNC: NEGATIVE — SIGNIFICANT CHANGE UP
MCHC RBC-ENTMCNC: 30.2 PG — SIGNIFICANT CHANGE UP (ref 27–34)
MCHC RBC-ENTMCNC: 34.8 GM/DL — SIGNIFICANT CHANGE UP (ref 32–36)
MCV RBC AUTO: 86.7 FL — SIGNIFICANT CHANGE UP (ref 80–100)
NITRITE UR-MCNC: NEGATIVE — SIGNIFICANT CHANGE UP
NRBC # BLD: 0 /100 WBCS — SIGNIFICANT CHANGE UP
NRBC # FLD: 0 K/UL — SIGNIFICANT CHANGE UP
NT-PROBNP SERPL-SCNC: 337 PG/ML — HIGH
PH UR: 6.5 — SIGNIFICANT CHANGE UP (ref 5–8)
PLATELET # BLD AUTO: 188 K/UL — SIGNIFICANT CHANGE UP (ref 150–400)
POTASSIUM SERPL-MCNC: 3.9 MMOL/L — SIGNIFICANT CHANGE UP (ref 3.5–5.3)
POTASSIUM SERPL-SCNC: 3.9 MMOL/L — SIGNIFICANT CHANGE UP (ref 3.5–5.3)
PROCALCITONIN SERPL-MCNC: 0.16 NG/ML — HIGH (ref 0.02–0.1)
PROT SERPL-MCNC: 6.2 G/DL — SIGNIFICANT CHANGE UP (ref 6–8.3)
PROT UR-MCNC: ABNORMAL
RBC # BLD: 4.14 M/UL — SIGNIFICANT CHANGE UP (ref 3.8–5.2)
RBC # FLD: 12.2 % — SIGNIFICANT CHANGE UP (ref 10.3–14.5)
RBC CASTS # UR COMP ASSIST: 1 /HPF — SIGNIFICANT CHANGE UP (ref 0–4)
SODIUM SERPL-SCNC: 127 MMOL/L — LOW (ref 135–145)
SP GR SPEC: 1.01 — LOW (ref 1.01–1.02)
UROBILINOGEN FLD QL: ABNORMAL
WBC # BLD: 9.98 K/UL — SIGNIFICANT CHANGE UP (ref 3.8–10.5)
WBC # FLD AUTO: 9.98 K/UL — SIGNIFICANT CHANGE UP (ref 3.8–10.5)
WBC UR QL: 1 /HPF — SIGNIFICANT CHANGE UP (ref 0–5)

## 2021-01-27 PROCEDURE — 99233 SBSQ HOSP IP/OBS HIGH 50: CPT

## 2021-01-27 RX ORDER — SENNA PLUS 8.6 MG/1
2 TABLET ORAL AT BEDTIME
Refills: 0 | Status: DISCONTINUED | OUTPATIENT
Start: 2021-01-27 | End: 2021-02-02

## 2021-01-27 RX ORDER — PIPERACILLIN AND TAZOBACTAM 4; .5 G/20ML; G/20ML
3.38 INJECTION, POWDER, LYOPHILIZED, FOR SOLUTION INTRAVENOUS EVERY 8 HOURS
Refills: 0 | Status: DISCONTINUED | OUTPATIENT
Start: 2021-01-27 | End: 2021-01-30

## 2021-01-27 RX ORDER — SODIUM CHLORIDE 9 MG/ML
1000 INJECTION INTRAMUSCULAR; INTRAVENOUS; SUBCUTANEOUS
Refills: 0 | Status: DISCONTINUED | OUTPATIENT
Start: 2021-01-27 | End: 2021-01-27

## 2021-01-27 RX ORDER — FUROSEMIDE 40 MG
40 TABLET ORAL ONCE
Refills: 0 | Status: COMPLETED | OUTPATIENT
Start: 2021-01-27 | End: 2021-01-27

## 2021-01-27 RX ORDER — PIPERACILLIN AND TAZOBACTAM 4; .5 G/20ML; G/20ML
3.38 INJECTION, POWDER, LYOPHILIZED, FOR SOLUTION INTRAVENOUS ONCE
Refills: 0 | Status: COMPLETED | OUTPATIENT
Start: 2021-01-27 | End: 2021-01-27

## 2021-01-27 RX ORDER — POLYETHYLENE GLYCOL 3350 17 G/17G
17 POWDER, FOR SOLUTION ORAL DAILY
Refills: 0 | Status: DISCONTINUED | OUTPATIENT
Start: 2021-01-27 | End: 2021-02-02

## 2021-01-27 RX ADMIN — Medication 4: at 17:48

## 2021-01-27 RX ADMIN — PIPERACILLIN AND TAZOBACTAM 200 GRAM(S): 4; .5 INJECTION, POWDER, LYOPHILIZED, FOR SOLUTION INTRAVENOUS at 19:20

## 2021-01-27 RX ADMIN — ATORVASTATIN CALCIUM 10 MILLIGRAM(S): 80 TABLET, FILM COATED ORAL at 20:41

## 2021-01-27 RX ADMIN — ENOXAPARIN SODIUM 40 MILLIGRAM(S): 100 INJECTION SUBCUTANEOUS at 11:37

## 2021-01-27 RX ADMIN — Medication 650 MILLIGRAM(S): at 12:29

## 2021-01-27 RX ADMIN — Medication 40 MILLIGRAM(S): at 23:03

## 2021-01-27 RX ADMIN — LOSARTAN POTASSIUM 50 MILLIGRAM(S): 100 TABLET, FILM COATED ORAL at 05:32

## 2021-01-27 RX ADMIN — Medication 6: at 12:08

## 2021-01-27 RX ADMIN — AMLODIPINE BESYLATE 5 MILLIGRAM(S): 2.5 TABLET ORAL at 05:32

## 2021-01-27 RX ADMIN — SODIUM CHLORIDE 80 MILLILITER(S): 9 INJECTION INTRAMUSCULAR; INTRAVENOUS; SUBCUTANEOUS at 08:58

## 2021-01-27 RX ADMIN — Medication 3 MILLIGRAM(S): at 20:41

## 2021-01-27 RX ADMIN — PANTOPRAZOLE SODIUM 40 MILLIGRAM(S): 20 TABLET, DELAYED RELEASE ORAL at 05:32

## 2021-01-27 RX ADMIN — SENNA PLUS 2 TABLET(S): 8.6 TABLET ORAL at 20:41

## 2021-01-27 RX ADMIN — Medication 81 MILLIGRAM(S): at 11:37

## 2021-01-28 LAB
ALBUMIN SERPL ELPH-MCNC: 2.7 G/DL — LOW (ref 3.3–5)
ALP SERPL-CCNC: 71 U/L — SIGNIFICANT CHANGE UP (ref 40–120)
ALT FLD-CCNC: 21 U/L — SIGNIFICANT CHANGE UP (ref 4–33)
ANION GAP SERPL CALC-SCNC: 10 MMOL/L — SIGNIFICANT CHANGE UP (ref 7–14)
AST SERPL-CCNC: 25 U/L — SIGNIFICANT CHANGE UP (ref 4–32)
BILIRUB DIRECT SERPL-MCNC: 0.5 MG/DL — HIGH (ref 0–0.2)
BILIRUB INDIRECT FLD-MCNC: 1.1 MG/DL — HIGH (ref 0–1)
BILIRUB SERPL-MCNC: 1.4 MG/DL — HIGH (ref 0.2–1.2)
BILIRUB SERPL-MCNC: 1.6 MG/DL — HIGH (ref 0.2–1.2)
BUN SERPL-MCNC: 13 MG/DL — SIGNIFICANT CHANGE UP (ref 7–23)
CALCIUM SERPL-MCNC: 8.6 MG/DL — SIGNIFICANT CHANGE UP (ref 8.4–10.5)
CHLORIDE SERPL-SCNC: 93 MMOL/L — LOW (ref 98–107)
CO2 SERPL-SCNC: 24 MMOL/L — SIGNIFICANT CHANGE UP (ref 22–31)
CREAT SERPL-MCNC: 0.9 MG/DL — SIGNIFICANT CHANGE UP (ref 0.5–1.3)
GLUCOSE BLDC GLUCOMTR-MCNC: 149 MG/DL — HIGH (ref 70–99)
GLUCOSE BLDC GLUCOMTR-MCNC: 206 MG/DL — HIGH (ref 70–99)
GLUCOSE BLDC GLUCOMTR-MCNC: 248 MG/DL — HIGH (ref 70–99)
GLUCOSE BLDC GLUCOMTR-MCNC: 277 MG/DL — HIGH (ref 70–99)
GLUCOSE SERPL-MCNC: 158 MG/DL — HIGH (ref 70–99)
HCT VFR BLD CALC: 36.5 % — SIGNIFICANT CHANGE UP (ref 34.5–45)
HGB BLD-MCNC: 12.5 G/DL — SIGNIFICANT CHANGE UP (ref 11.5–15.5)
LDH SERPL L TO P-CCNC: 202 U/L — SIGNIFICANT CHANGE UP (ref 135–225)
MAGNESIUM SERPL-MCNC: 2 MG/DL — SIGNIFICANT CHANGE UP (ref 1.6–2.6)
MCHC RBC-ENTMCNC: 29.7 PG — SIGNIFICANT CHANGE UP (ref 27–34)
MCHC RBC-ENTMCNC: 34.2 GM/DL — SIGNIFICANT CHANGE UP (ref 32–36)
MCV RBC AUTO: 86.7 FL — SIGNIFICANT CHANGE UP (ref 80–100)
NRBC # BLD: 0 /100 WBCS — SIGNIFICANT CHANGE UP
NRBC # FLD: 0 K/UL — SIGNIFICANT CHANGE UP
PHOSPHATE SERPL-MCNC: 2.7 MG/DL — SIGNIFICANT CHANGE UP (ref 2.5–4.5)
PLATELET # BLD AUTO: 208 K/UL — SIGNIFICANT CHANGE UP (ref 150–400)
POTASSIUM SERPL-MCNC: 3.4 MMOL/L — LOW (ref 3.5–5.3)
POTASSIUM SERPL-SCNC: 3.4 MMOL/L — LOW (ref 3.5–5.3)
PROT SERPL-MCNC: 6.1 G/DL — SIGNIFICANT CHANGE UP (ref 6–8.3)
RBC # BLD: 4.21 M/UL — SIGNIFICANT CHANGE UP (ref 3.8–5.2)
RBC # FLD: 12.4 % — SIGNIFICANT CHANGE UP (ref 10.3–14.5)
SODIUM SERPL-SCNC: 127 MMOL/L — LOW (ref 135–145)
WBC # BLD: 7.79 K/UL — SIGNIFICANT CHANGE UP (ref 3.8–10.5)
WBC # FLD AUTO: 7.79 K/UL — SIGNIFICANT CHANGE UP (ref 3.8–10.5)

## 2021-01-28 PROCEDURE — 99233 SBSQ HOSP IP/OBS HIGH 50: CPT

## 2021-01-28 RX ORDER — FUROSEMIDE 40 MG
20 TABLET ORAL ONCE
Refills: 0 | Status: DISCONTINUED | OUTPATIENT
Start: 2021-01-28 | End: 2021-01-30

## 2021-01-28 RX ORDER — FUROSEMIDE 40 MG
20 TABLET ORAL DAILY
Refills: 0 | Status: DISCONTINUED | OUTPATIENT
Start: 2021-01-28 | End: 2021-02-02

## 2021-01-28 RX ADMIN — Medication 6: at 17:16

## 2021-01-28 RX ADMIN — Medication 81 MILLIGRAM(S): at 12:31

## 2021-01-28 RX ADMIN — PIPERACILLIN AND TAZOBACTAM 25 GRAM(S): 4; .5 INJECTION, POWDER, LYOPHILIZED, FOR SOLUTION INTRAVENOUS at 20:49

## 2021-01-28 RX ADMIN — Medication 4: at 07:47

## 2021-01-28 RX ADMIN — ENOXAPARIN SODIUM 40 MILLIGRAM(S): 100 INJECTION SUBCUTANEOUS at 12:31

## 2021-01-28 RX ADMIN — PIPERACILLIN AND TAZOBACTAM 25 GRAM(S): 4; .5 INJECTION, POWDER, LYOPHILIZED, FOR SOLUTION INTRAVENOUS at 03:01

## 2021-01-28 RX ADMIN — PANTOPRAZOLE SODIUM 40 MILLIGRAM(S): 20 TABLET, DELAYED RELEASE ORAL at 04:35

## 2021-01-28 RX ADMIN — Medication 4: at 12:05

## 2021-01-28 RX ADMIN — Medication 3 MILLIGRAM(S): at 20:49

## 2021-01-28 RX ADMIN — PIPERACILLIN AND TAZOBACTAM 25 GRAM(S): 4; .5 INJECTION, POWDER, LYOPHILIZED, FOR SOLUTION INTRAVENOUS at 12:32

## 2021-01-28 RX ADMIN — SENNA PLUS 2 TABLET(S): 8.6 TABLET ORAL at 20:49

## 2021-01-28 RX ADMIN — ATORVASTATIN CALCIUM 10 MILLIGRAM(S): 80 TABLET, FILM COATED ORAL at 20:49

## 2021-01-28 NOTE — SWALLOW BEDSIDE ASSESSMENT ADULT - COMMENTS
Progress Note 1/27/21: 85 Mandarin Speaking F PMH HTN, HLD, DM2, CVA/TIA 2020 w/ full resolution of symptoms in 3 days (confused, no speech or limb paralysis at [facility]) tx from [facility] w/ hypoxia 2/2 COVID-19.    CXR 1/26/21: No significant change in the bilateral multifocal patchy airspace infiltrates with basilar predominance. Trace right pleural effusion    Patient was seen upright at bedside with nasal canula in place. Patient was alert/awake and fully cooperative for assessment.

## 2021-01-28 NOTE — SWALLOW BEDSIDE ASSESSMENT ADULT - SWALLOW EVAL: DIAGNOSIS
Patient presents with functional oropharyngeal swallow. The oral phase was marked by adequate stripping of bolus from utensil, adequate oral containment, adequate mastication for regular solids, adequate bolus manipulation, and adequate anterior to posterior transport. The pharyngeal phase was marked by laryngeal elevation upon digital palpation with initiation of pharyngeal swallow. There was no overt s/s of laryngeal penetration/aspiration for puree consistency, regular solids and thin liquids.

## 2021-01-29 LAB
GLUCOSE BLDC GLUCOMTR-MCNC: 175 MG/DL — HIGH (ref 70–99)
GLUCOSE BLDC GLUCOMTR-MCNC: 204 MG/DL — HIGH (ref 70–99)
GLUCOSE BLDC GLUCOMTR-MCNC: 217 MG/DL — HIGH (ref 70–99)
GLUCOSE BLDC GLUCOMTR-MCNC: 266 MG/DL — HIGH (ref 70–99)
GLUCOSE BLDC GLUCOMTR-MCNC: 356 MG/DL — HIGH (ref 70–99)
HCT VFR BLD CALC: 41.5 % — SIGNIFICANT CHANGE UP (ref 34.5–45)
HGB BLD-MCNC: 13.9 G/DL — SIGNIFICANT CHANGE UP (ref 11.5–15.5)
MAGNESIUM SERPL-MCNC: 2.2 MG/DL — SIGNIFICANT CHANGE UP (ref 1.6–2.6)
MCHC RBC-ENTMCNC: 29.6 PG — SIGNIFICANT CHANGE UP (ref 27–34)
MCHC RBC-ENTMCNC: 33.5 GM/DL — SIGNIFICANT CHANGE UP (ref 32–36)
MCV RBC AUTO: 88.3 FL — SIGNIFICANT CHANGE UP (ref 80–100)
NRBC # BLD: 0 /100 WBCS — SIGNIFICANT CHANGE UP
NRBC # FLD: 0 K/UL — SIGNIFICANT CHANGE UP
PHOSPHATE SERPL-MCNC: 3.3 MG/DL — SIGNIFICANT CHANGE UP (ref 2.5–4.5)
PLATELET # BLD AUTO: 277 K/UL — SIGNIFICANT CHANGE UP (ref 150–400)
RBC # BLD: 4.7 M/UL — SIGNIFICANT CHANGE UP (ref 3.8–5.2)
RBC # FLD: 12.2 % — SIGNIFICANT CHANGE UP (ref 10.3–14.5)
WBC # BLD: 12.05 K/UL — HIGH (ref 3.8–10.5)
WBC # FLD AUTO: 12.05 K/UL — HIGH (ref 3.8–10.5)

## 2021-01-29 PROCEDURE — 99233 SBSQ HOSP IP/OBS HIGH 50: CPT

## 2021-01-29 PROCEDURE — 76705 ECHO EXAM OF ABDOMEN: CPT | Mod: 26

## 2021-01-29 RX ORDER — POTASSIUM CHLORIDE 20 MEQ
40 PACKET (EA) ORAL ONCE
Refills: 0 | Status: DISCONTINUED | OUTPATIENT
Start: 2021-01-29 | End: 2021-02-02

## 2021-01-29 RX ORDER — POTASSIUM CHLORIDE 20 MEQ
40 PACKET (EA) ORAL ONCE
Refills: 0 | Status: COMPLETED | OUTPATIENT
Start: 2021-01-29 | End: 2021-01-29

## 2021-01-29 RX ADMIN — ENOXAPARIN SODIUM 40 MILLIGRAM(S): 100 INJECTION SUBCUTANEOUS at 12:33

## 2021-01-29 RX ADMIN — PIPERACILLIN AND TAZOBACTAM 25 GRAM(S): 4; .5 INJECTION, POWDER, LYOPHILIZED, FOR SOLUTION INTRAVENOUS at 12:33

## 2021-01-29 RX ADMIN — Medication 81 MILLIGRAM(S): at 12:33

## 2021-01-29 RX ADMIN — SENNA PLUS 2 TABLET(S): 8.6 TABLET ORAL at 21:52

## 2021-01-29 RX ADMIN — Medication 6: at 09:02

## 2021-01-29 RX ADMIN — Medication 40 MILLIEQUIVALENT(S): at 12:33

## 2021-01-29 RX ADMIN — PANTOPRAZOLE SODIUM 40 MILLIGRAM(S): 20 TABLET, DELAYED RELEASE ORAL at 05:17

## 2021-01-29 RX ADMIN — PIPERACILLIN AND TAZOBACTAM 25 GRAM(S): 4; .5 INJECTION, POWDER, LYOPHILIZED, FOR SOLUTION INTRAVENOUS at 05:17

## 2021-01-29 RX ADMIN — PIPERACILLIN AND TAZOBACTAM 25 GRAM(S): 4; .5 INJECTION, POWDER, LYOPHILIZED, FOR SOLUTION INTRAVENOUS at 20:41

## 2021-01-29 RX ADMIN — Medication 2: at 18:05

## 2021-01-29 RX ADMIN — ATORVASTATIN CALCIUM 10 MILLIGRAM(S): 80 TABLET, FILM COATED ORAL at 21:52

## 2021-01-29 RX ADMIN — Medication 3 MILLIGRAM(S): at 21:52

## 2021-01-29 RX ADMIN — Medication 10: at 12:32

## 2021-01-29 RX ADMIN — Medication 20 MILLIGRAM(S): at 05:17

## 2021-01-29 NOTE — PROVIDER CONTACT NOTE (OTHER) - ASSESSMENT
patient alert and oriented x 4, mandarin speaking, daughter used as  saying her mother is SOB after ambulating, aside from tachycardia vital signs stable

## 2021-01-29 NOTE — PROGRESS NOTE ADULT - PROBLEM SELECTOR PLAN 4
Got Remdisivir at [facility].  Us b/l extremities neg   fever could be due because of lingering Covid

## 2021-01-30 LAB
ANION GAP SERPL CALC-SCNC: 10 MMOL/L — SIGNIFICANT CHANGE UP (ref 7–14)
BUN SERPL-MCNC: 8 MG/DL — SIGNIFICANT CHANGE UP (ref 7–23)
CALCIUM SERPL-MCNC: 8.7 MG/DL — SIGNIFICANT CHANGE UP (ref 8.4–10.5)
CHLORIDE SERPL-SCNC: 96 MMOL/L — LOW (ref 98–107)
CO2 SERPL-SCNC: 22 MMOL/L — SIGNIFICANT CHANGE UP (ref 22–31)
CREAT SERPL-MCNC: 0.72 MG/DL — SIGNIFICANT CHANGE UP (ref 0.5–1.3)
CRP SERPL-MCNC: 44.1 MG/L — HIGH
D DIMER BLD IA.RAPID-MCNC: 1078 NG/ML DDU — HIGH
FERRITIN SERPL-MCNC: 575 NG/ML — HIGH (ref 15–150)
GLUCOSE BLDC GLUCOMTR-MCNC: 234 MG/DL — HIGH (ref 70–99)
GLUCOSE BLDC GLUCOMTR-MCNC: 235 MG/DL — HIGH (ref 70–99)
GLUCOSE BLDC GLUCOMTR-MCNC: 286 MG/DL — HIGH (ref 70–99)
GLUCOSE BLDC GLUCOMTR-MCNC: 356 MG/DL — HIGH (ref 70–99)
GLUCOSE SERPL-MCNC: 202 MG/DL — HIGH (ref 70–99)
HCT VFR BLD CALC: 35.3 % — SIGNIFICANT CHANGE UP (ref 34.5–45)
HGB BLD-MCNC: 11.9 G/DL — SIGNIFICANT CHANGE UP (ref 11.5–15.5)
MAGNESIUM SERPL-MCNC: 2 MG/DL — SIGNIFICANT CHANGE UP (ref 1.6–2.6)
MCHC RBC-ENTMCNC: 29.8 PG — SIGNIFICANT CHANGE UP (ref 27–34)
MCHC RBC-ENTMCNC: 33.7 GM/DL — SIGNIFICANT CHANGE UP (ref 32–36)
MCV RBC AUTO: 88.5 FL — SIGNIFICANT CHANGE UP (ref 80–100)
NRBC # BLD: 0 /100 WBCS — SIGNIFICANT CHANGE UP
NRBC # FLD: 0 K/UL — SIGNIFICANT CHANGE UP
PHOSPHATE SERPL-MCNC: 2.9 MG/DL — SIGNIFICANT CHANGE UP (ref 2.5–4.5)
PLATELET # BLD AUTO: 240 K/UL — SIGNIFICANT CHANGE UP (ref 150–400)
POTASSIUM SERPL-MCNC: 4.2 MMOL/L — SIGNIFICANT CHANGE UP (ref 3.5–5.3)
POTASSIUM SERPL-SCNC: 4.2 MMOL/L — SIGNIFICANT CHANGE UP (ref 3.5–5.3)
PROCALCITONIN SERPL-MCNC: 0.1 NG/ML — SIGNIFICANT CHANGE UP (ref 0.02–0.1)
RBC # BLD: 3.99 M/UL — SIGNIFICANT CHANGE UP (ref 3.8–5.2)
RBC # FLD: 12.3 % — SIGNIFICANT CHANGE UP (ref 10.3–14.5)
SODIUM SERPL-SCNC: 128 MMOL/L — LOW (ref 135–145)
WBC # BLD: 7.41 K/UL — SIGNIFICANT CHANGE UP (ref 3.8–10.5)
WBC # FLD AUTO: 7.41 K/UL — SIGNIFICANT CHANGE UP (ref 3.8–10.5)

## 2021-01-30 PROCEDURE — 99233 SBSQ HOSP IP/OBS HIGH 50: CPT | Mod: CS

## 2021-01-30 RX ORDER — INSULIN LISPRO 100/ML
3 VIAL (ML) SUBCUTANEOUS
Refills: 0 | Status: DISCONTINUED | OUTPATIENT
Start: 2021-01-30 | End: 2021-02-02

## 2021-01-30 RX ORDER — SODIUM CHLORIDE 9 MG/ML
1 INJECTION INTRAMUSCULAR; INTRAVENOUS; SUBCUTANEOUS
Refills: 0 | Status: DISCONTINUED | OUTPATIENT
Start: 2021-01-30 | End: 2021-01-30

## 2021-01-30 RX ORDER — SODIUM CHLORIDE 9 MG/ML
1 INJECTION INTRAMUSCULAR; INTRAVENOUS; SUBCUTANEOUS
Refills: 0 | Status: COMPLETED | OUTPATIENT
Start: 2021-01-30 | End: 2021-01-31

## 2021-01-30 RX ORDER — INSULIN GLARGINE 100 [IU]/ML
10 INJECTION, SOLUTION SUBCUTANEOUS AT BEDTIME
Refills: 0 | Status: DISCONTINUED | OUTPATIENT
Start: 2021-01-30 | End: 2021-02-02

## 2021-01-30 RX ADMIN — Medication 10: at 12:17

## 2021-01-30 RX ADMIN — ATORVASTATIN CALCIUM 10 MILLIGRAM(S): 80 TABLET, FILM COATED ORAL at 22:52

## 2021-01-30 RX ADMIN — Medication 6: at 17:23

## 2021-01-30 RX ADMIN — Medication 4: at 08:07

## 2021-01-30 RX ADMIN — Medication 81 MILLIGRAM(S): at 12:17

## 2021-01-30 RX ADMIN — AMLODIPINE BESYLATE 5 MILLIGRAM(S): 2.5 TABLET ORAL at 05:21

## 2021-01-30 RX ADMIN — SENNA PLUS 2 TABLET(S): 8.6 TABLET ORAL at 22:52

## 2021-01-30 RX ADMIN — PIPERACILLIN AND TAZOBACTAM 25 GRAM(S): 4; .5 INJECTION, POWDER, LYOPHILIZED, FOR SOLUTION INTRAVENOUS at 12:16

## 2021-01-30 RX ADMIN — PANTOPRAZOLE SODIUM 40 MILLIGRAM(S): 20 TABLET, DELAYED RELEASE ORAL at 05:21

## 2021-01-30 RX ADMIN — INSULIN GLARGINE 10 UNIT(S): 100 INJECTION, SOLUTION SUBCUTANEOUS at 22:52

## 2021-01-30 RX ADMIN — LOSARTAN POTASSIUM 50 MILLIGRAM(S): 100 TABLET, FILM COATED ORAL at 05:21

## 2021-01-30 RX ADMIN — Medication 1 TABLET(S): at 22:52

## 2021-01-30 RX ADMIN — PIPERACILLIN AND TAZOBACTAM 25 GRAM(S): 4; .5 INJECTION, POWDER, LYOPHILIZED, FOR SOLUTION INTRAVENOUS at 05:21

## 2021-01-30 RX ADMIN — SODIUM CHLORIDE 1 GRAM(S): 9 INJECTION INTRAMUSCULAR; INTRAVENOUS; SUBCUTANEOUS at 22:52

## 2021-01-30 RX ADMIN — ENOXAPARIN SODIUM 40 MILLIGRAM(S): 100 INJECTION SUBCUTANEOUS at 12:17

## 2021-01-30 RX ADMIN — Medication 3 MILLIGRAM(S): at 22:52

## 2021-01-30 RX ADMIN — Medication 20 MILLIGRAM(S): at 05:21

## 2021-01-30 NOTE — PROGRESS NOTE ADULT - PROBLEM SELECTOR PLAN 2
127 today  water restriction 1000 cc   will give patient Na cl 1 gram tab po Bid for 3 tabs , f/up Na level

## 2021-01-30 NOTE — PROGRESS NOTE ADULT - PROBLEM SELECTOR PLAN 4
Got Remdisivir at [facility].  Us b/l extremities neg   fever could be due because of lingering Covid or superimposed bacterial inf ?   it responded to antibiotic treatment

## 2021-01-30 NOTE — PROGRESS NOTE ADULT - PROBLEM SELECTOR PLAN 3
resolved   sec to Pneumonia due to COVID. was on Zosyn Iv  for 3 days and will continue another 4 days course of antibiotics for possible aspiration pneumonia as patient fever stopped since on antibiotics and her inflammatory markers are trending down.

## 2021-01-31 ENCOUNTER — TRANSCRIPTION ENCOUNTER (OUTPATIENT)
Age: 86
End: 2021-01-31

## 2021-01-31 LAB
ANION GAP SERPL CALC-SCNC: 10 MMOL/L — SIGNIFICANT CHANGE UP (ref 7–14)
BUN SERPL-MCNC: 8 MG/DL — SIGNIFICANT CHANGE UP (ref 7–23)
CALCIUM SERPL-MCNC: 9.1 MG/DL — SIGNIFICANT CHANGE UP (ref 8.4–10.5)
CHLORIDE SERPL-SCNC: 96 MMOL/L — LOW (ref 98–107)
CO2 SERPL-SCNC: 26 MMOL/L — SIGNIFICANT CHANGE UP (ref 22–31)
CREAT SERPL-MCNC: 0.72 MG/DL — SIGNIFICANT CHANGE UP (ref 0.5–1.3)
CULTURE RESULTS: SIGNIFICANT CHANGE UP
CULTURE RESULTS: SIGNIFICANT CHANGE UP
GLUCOSE BLDC GLUCOMTR-MCNC: 149 MG/DL — HIGH (ref 70–99)
GLUCOSE BLDC GLUCOMTR-MCNC: 191 MG/DL — HIGH (ref 70–99)
GLUCOSE BLDC GLUCOMTR-MCNC: 219 MG/DL — HIGH (ref 70–99)
GLUCOSE BLDC GLUCOMTR-MCNC: 228 MG/DL — HIGH (ref 70–99)
GLUCOSE BLDC GLUCOMTR-MCNC: 283 MG/DL — HIGH (ref 70–99)
GLUCOSE SERPL-MCNC: 201 MG/DL — HIGH (ref 70–99)
HCT VFR BLD CALC: 39.2 % — SIGNIFICANT CHANGE UP (ref 34.5–45)
HGB BLD-MCNC: 12.9 G/DL — SIGNIFICANT CHANGE UP (ref 11.5–15.5)
LDH SERPL L TO P-CCNC: 188 U/L — SIGNIFICANT CHANGE UP (ref 135–225)
MAGNESIUM SERPL-MCNC: 2 MG/DL — SIGNIFICANT CHANGE UP (ref 1.6–2.6)
MCHC RBC-ENTMCNC: 29.3 PG — SIGNIFICANT CHANGE UP (ref 27–34)
MCHC RBC-ENTMCNC: 32.9 GM/DL — SIGNIFICANT CHANGE UP (ref 32–36)
MCV RBC AUTO: 89.1 FL — SIGNIFICANT CHANGE UP (ref 80–100)
NRBC # BLD: 0 /100 WBCS — SIGNIFICANT CHANGE UP
NRBC # FLD: 0 K/UL — SIGNIFICANT CHANGE UP
PHOSPHATE SERPL-MCNC: 3.6 MG/DL — SIGNIFICANT CHANGE UP (ref 2.5–4.5)
PLATELET # BLD AUTO: 261 K/UL — SIGNIFICANT CHANGE UP (ref 150–400)
POTASSIUM SERPL-MCNC: 4.1 MMOL/L — SIGNIFICANT CHANGE UP (ref 3.5–5.3)
POTASSIUM SERPL-SCNC: 4.1 MMOL/L — SIGNIFICANT CHANGE UP (ref 3.5–5.3)
RBC # BLD: 4.4 M/UL — SIGNIFICANT CHANGE UP (ref 3.8–5.2)
RBC # FLD: 12.3 % — SIGNIFICANT CHANGE UP (ref 10.3–14.5)
SODIUM SERPL-SCNC: 132 MMOL/L — LOW (ref 135–145)
SPECIMEN SOURCE: SIGNIFICANT CHANGE UP
SPECIMEN SOURCE: SIGNIFICANT CHANGE UP
WBC # BLD: 6.04 K/UL — SIGNIFICANT CHANGE UP (ref 3.8–10.5)
WBC # FLD AUTO: 6.04 K/UL — SIGNIFICANT CHANGE UP (ref 3.8–10.5)

## 2021-01-31 PROCEDURE — 99233 SBSQ HOSP IP/OBS HIGH 50: CPT | Mod: CS

## 2021-01-31 RX ORDER — POLYETHYLENE GLYCOL 3350 17 G/17G
17 POWDER, FOR SOLUTION ORAL
Qty: 0 | Refills: 0 | DISCHARGE
Start: 2021-01-31

## 2021-01-31 RX ORDER — LANOLIN ALCOHOL/MO/W.PET/CERES
1 CREAM (GRAM) TOPICAL
Qty: 0 | Refills: 0 | DISCHARGE
Start: 2021-01-31

## 2021-01-31 RX ADMIN — ATORVASTATIN CALCIUM 10 MILLIGRAM(S): 80 TABLET, FILM COATED ORAL at 23:31

## 2021-01-31 RX ADMIN — SENNA PLUS 2 TABLET(S): 8.6 TABLET ORAL at 23:32

## 2021-01-31 RX ADMIN — Medication 3 UNIT(S): at 09:30

## 2021-01-31 RX ADMIN — Medication 3 UNIT(S): at 17:20

## 2021-01-31 RX ADMIN — Medication 1 TABLET(S): at 17:20

## 2021-01-31 RX ADMIN — INSULIN GLARGINE 10 UNIT(S): 100 INJECTION, SOLUTION SUBCUTANEOUS at 21:22

## 2021-01-31 RX ADMIN — Medication 81 MILLIGRAM(S): at 12:15

## 2021-01-31 RX ADMIN — AMLODIPINE BESYLATE 5 MILLIGRAM(S): 2.5 TABLET ORAL at 07:19

## 2021-01-31 RX ADMIN — SODIUM CHLORIDE 1 GRAM(S): 9 INJECTION INTRAMUSCULAR; INTRAVENOUS; SUBCUTANEOUS at 17:20

## 2021-01-31 RX ADMIN — LOSARTAN POTASSIUM 50 MILLIGRAM(S): 100 TABLET, FILM COATED ORAL at 07:19

## 2021-01-31 RX ADMIN — SODIUM CHLORIDE 1 GRAM(S): 9 INJECTION INTRAMUSCULAR; INTRAVENOUS; SUBCUTANEOUS at 07:19

## 2021-01-31 RX ADMIN — Medication 3 UNIT(S): at 12:16

## 2021-01-31 RX ADMIN — Medication 1 TABLET(S): at 07:19

## 2021-01-31 RX ADMIN — PANTOPRAZOLE SODIUM 40 MILLIGRAM(S): 20 TABLET, DELAYED RELEASE ORAL at 07:19

## 2021-01-31 RX ADMIN — Medication 20 MILLIGRAM(S): at 07:32

## 2021-01-31 RX ADMIN — Medication 4: at 09:30

## 2021-01-31 RX ADMIN — Medication 6: at 12:15

## 2021-01-31 RX ADMIN — Medication 3 MILLIGRAM(S): at 23:32

## 2021-01-31 RX ADMIN — ENOXAPARIN SODIUM 40 MILLIGRAM(S): 100 INJECTION SUBCUTANEOUS at 12:15

## 2021-01-31 NOTE — DISCHARGE NOTE PROVIDER - NSDCMRMEDTOKEN_GEN_ALL_CORE_FT
acetaminophen 325 mg oral tablet: 2 tab(s) orally every 4 hours, As needed, Temp greater or equal to 38.5C (101.3F)  amLODIPine 5 mg oral tablet: 1  orally 2 times a day  aspirin 81 mg oral tablet: 1 tab(s) orally once a day  atorvastatin 10 mg oral tablet: 1  orally once a day (at bedtime)  bisoprolol 10 mg oral tablet: 1 tab(s) orally once a day  gabapentin: 1000 milligram(s) orally 2 times a day, As Needed  glipizide-metformin 2.5 mg-500 mg oral tablet: 1 tab(s) orally 2 times a day  Janumet 50 mg-1000 mg oral tablet: 1 tab(s) orally once a day (at bedtime)  losartan 50 mg oral tablet: 1  orally once a day  melatonin 3 mg oral tablet: 1 tab(s) orally once a day (at bedtime)  omeprazole 40 mg oral delayed release capsule: 1 cap(s) orally once a day  polyethylene glycol 3350 oral powder for reconstitution: 17 gram(s) orally once a day, As needed, Constipation   acetaminophen 325 mg oral tablet: 2 tab(s) orally every 4 hours, As needed, Temp greater or equal to 38.5C (101.3F)  amLODIPine 5 mg oral tablet: 1  orally 2 times a day  aspirin 81 mg oral tablet: 1 tab(s) orally once a day  atorvastatin 10 mg oral tablet: 1  orally once a day (at bedtime)  bisoprolol 10 mg oral tablet: 1 tab(s) orally once a day  gabapentin: 1000 milligram(s) orally 2 times a day, As Needed  glipizide-metformin 2.5 mg-500 mg oral tablet: 1 tab(s) orally 2 times a day  Janumet 50 mg-1000 mg oral tablet: 1 tab(s) orally once a day (at bedtime)  losartan 50 mg oral tablet: 1  orally once a day  melatonin 3 mg oral tablet: 1 tab(s) orally once a day (at bedtime)  omeprazole 40 mg oral delayed release capsule: 1 cap(s) orally once a day  polyethylene glycol 3350 oral powder for reconstitution: 17 gram(s) orally once a day, As needed, Constipation  rolling walker: use as directed   acetaminophen 325 mg oral tablet: 2 tab(s) orally every 4 hours, As needed, Temp greater or equal to 38.5C (101.3F)  amLODIPine 5 mg oral tablet: 1 tab(s) orally once a day  aspirin 81 mg oral tablet: 1 tab(s) orally once a day  atorvastatin 10 mg oral tablet: 1  orally once a day (at bedtime)  Augmentin 875 mg-125 mg oral tablet: 1 tab(s) orally 2 times a day   bisoprolol 10 mg oral tablet: 1 tab(s) orally once a day  gabapentin: 1000 milligram(s) orally 2 times a day, As Needed  glipizide-metformin 2.5 mg-500 mg oral tablet: 1 tab(s) orally 2 times a day  Janumet 50 mg-1000 mg oral tablet: 1 tab(s) orally once a day (at bedtime)  losartan 50 mg oral tablet: 1  orally once a day  melatonin 3 mg oral tablet: 1 tab(s) orally once a day (at bedtime)  omeprazole 40 mg oral delayed release capsule: 1 cap(s) orally once a day  polyethylene glycol 3350 oral powder for reconstitution: 17 gram(s) orally once a day, As needed, Constipation  rolling walker: use as directed

## 2021-01-31 NOTE — DISCHARGE NOTE PROVIDER - HOSPITAL COURSE
Patient is an 86 y/o Mandarin Speaking F PMH HTN, HLD, DM2, CVA/TIA 2020 w/ full resolution of symptoms in 3 days (confused, no speech or limb paralysis at [facility]) tx from [facility] w/ hypoxia 2/2 COVID-19.  Hospitalized at [facility] 1/16-/19 for COVID-19, presented there w/ fever, cough, malaise, anorexia w/ hypxia, received decadron and remdesivir, weaned off O2, discharged home.  Returned to [facility] on 1/21 w/ hypoxia to 91%, found to be as low as 88% on RA at [facility].  Currently denying any symptoms.    85 Mandarin Speaking F PMH HTN, HLD, DM2, CVA/TIA 2020 w/ full resolution of symptoms in 3 days (confused, no speech or limb paralysis at [facility]) tx from [facility] w/ hypoxia 2/2 COVID-19.    Problem/Plan - 1:  ·  Problem: Acute respiratory failure with hypoxia.  Plan: c/w O2 NC, titrate as needed.   1/31: Plan: resolved   sec to Pneumonia due to COVID. was on Zosyn Iv  for 3 days and will continue another 4 days course of antibiotics for possible aspiration pneumonia as patient fever stopped since on antibiotics and her inflammatory    Problem/Plan - 2:  ·  Problem: COVID-19.  Plan: O2 as above, received decadron, remdesivir during [place] hospitalization, supportive care, set up O2 for home use.  DNR/DNI, MOLST in chart.    Problem/Plan - 3:  ·  Problem: Diabetes mellitus type 2 in nonobese.  Plan: FS AC/QHS w/ sliding scale.     Problem/Plan - 4:  ·  Problem: Essential hypertension.  Plan: c/w home meds.     Problem/Plan - 5:    Problem: Generalized weakness.  Plan: PT , nutritionist consult appreciated . Glucerna 2 cans a day      Patient is an 86 y/o Mandarin Speaking F PMH HTN, HLD, DM2, CVA/TIA 2020 w/ full resolution of symptoms in 3 days (confused, no speech or limb paralysis at [facility]) tx from [facility] w/ hypoxia 2/2 COVID-19.  Hospitalized at [facility] 1/16-/19 for COVID-19, presented there w/ fever, cough, malaise, anorexia w/ hypxia, received decadron and remdesivir, weaned off O2, discharged home.  Returned to [facility] on 1/21 w/ hypoxia to 91%, found to be as low as 88% on RA at [facility].  Currently denying any symptoms.    85 Mandarin Speaking F PMH HTN, HLD, DM2, CVA/TIA 2020 w/ full resolution of symptoms in 3 days (confused, no speech or limb paralysis at [facility]) tx from [facility] w/ hypoxia 2/2 COVID-19.      ·  Problem: Acute respiratory failure with hypoxia.  Plan: c/w O2 NC, titrate as needed.   1/31: Plan: resolved   sec to Pneumonia due to COVID. was on Zosyn Iv  for 3 days and will continue another 4 days course of antibiotics for possible aspiration pneumonia as patient fever stopped since on antibiotics and her inflammatory    ·  Problem: COVID-19.  Plan: O2 as above, received decadron, remdesivir during [place] hospitalization, supportive care, set up O2 for home use.  DNR/DNI, MOLST in chart.      ·  Problem: Diabetes mellitus type 2 in nonobese.  Plan: FS AC/QHS w/ sliding scale.     ·  Problem: Essential hypertension.  Plan: c/w home meds.         Problem: Generalized weakness.  Plan: PT , nutritionist consult appreciated . Glucerna 2 cans a day     Patient is hemodynamically stable and without complaints and patient is ready for discharge.  Case discussed and medications reviewed with patient and PMD.  Agreed with above.  Medications needed sent to pharmacy.     Patient is an 86 y/o Mandarin Speaking F PMH HTN, HLD, DM2, CVA/TIA 2020 w/ full resolution of symptoms in 3 days (confused, no speech or limb paralysis at [facility]) tx from [facility] w/ hypoxia 2/2 COVID-19.  Hospitalized at [facility] 1/16-/19 for COVID-19, presented there w/ fever, cough, malaise, anorexia w/ hypxia, received decadron and remdesivir, weaned off O2, discharged home.  Returned to [facility] on 1/21 w/ hypoxia to 91%, found to be as low as 88% on RA at [facility].  Currently denying any symptoms.    85 Mandarin Speaking F PMH HTN, HLD, DM2, CVA/TIA 2020 w/ full resolution of symptoms in 3 days (confused, no speech or limb paralysis at [facility]) tx from [facility] w/ hypoxia 2/2 COVID-19.     Generalized weakness  resolving   Glucerna 2 cans a day    had   fever for the past 2 days  , cxr b/l pneumonia   speech and swallow eval to eval - no aspiration   On augmentin 875/125 po Bid for 3 more days.    zosyn was d/c.      Hyponatremia  sodium  133  on water restriction 1000 cc  outpatient follow up for evaluation if persists.     Acute respiratory failure with hypoxia  - resolved   sec to Pneumonia due to COVID. was on Zosyn Iv  for 3 days and will continue another 4 days  total course of antibiotics  po for possible aspiration pneumonia as patient fever stopped since on antibiotics and her inflammatory markers are trending down.     COVID-19  -Got Remdisivir at [facility].  -Us b/l extremities neg   -fever could be due because of lingering Covid or superimposed bacterial inf ?   it responded to antibiotic treatment continue augmentin.      Diabetes mellitus type 2 in nonobese  -FS AC/QHS w/ sliding scale.     Essential hypertension  - cont Norvasc, stable.    Prophylactic measure  -on lovenox.     Discharge planning issues.  Plan: FU PT eval.  Pt was discharged from [facility] last week and had to be admitted after 2 day .  she is better now , to be discharged home tomorrow once.     Constipation,  -miralax , senna,    -resolved.     Patient is hemodynamically stable and without complaints and patient is ready for discharge.  Case discussed and medications reviewed with patient and PMD.  Agreed with above.  Medications needed sent to pharmacy.     Patient is an 84 y/o Mandarin Speaking F PMH HTN, HLD, DM2, CVA/TIA 2020 w/ full resolution of symptoms in 3 days (confused, no speech or limb paralysis at [facility]) tx from [facility] w/ hypoxia 2/2 COVID-19.  Hospitalized at [facility] 1/16-/19 for COVID-19, presented there w/ fever, cough, malaise, anorexia w/ hypxia, received decadron and remdesivir, weaned off O2, discharged home.  Returned to [facility] on 1/21 w/ hypoxia to 91%, found to be as low as 88% on RA at [facility].  Currently denying any symptoms.    85 Mandarin Speaking F PMH HTN, HLD, DM2, CVA/TIA 2020 w/ full resolution of symptoms in 3 days (confused, no speech or limb paralysis at [facility]) tx from [facility] w/ hypoxia 2/2 COVID-19.     Generalized weakness  resolving   Glucerna 2 cans a day    had   fever for the past 2 days  , cxr b/l pneumonia   speech and swallow eval to eval - no aspiration   On augmentin 875/125 po Bid for 3 more days.    zosyn was d/c.      Hyponatremia  sodium  133  on water restriction 1000 cc  outpatient follow up for evaluation if persists.     Acute respiratory failure with hypoxia  - resolved   sec to Pneumonia due to COVID. was on Zosyn Iv  for 3 days and will continue another 4 days  total course of antibiotics  po for possible aspiration pneumonia as patient fever stopped since on antibiotics and her inflammatory markers are trending down.     COVID-19  -Got Remdisivir at [facility].  -Us b/l extremities neg   -fever could be due because of lingering Covid or superimposed bacterial inf ?   it responded to antibiotic treatment continue augmentin.      Diabetes mellitus type 2 in nonobese  -FS AC/QHS w/ sliding scale.     Essential hypertension  - cont Norvasc, stable.    Prophylactic measure  -on lovenox.       Constipation,  -miralax , senna,    -resolved.       PT no skilled PT needs: Rolling walker prescribed.         Patient is hemodynamically stable and without complaints and patient is ready for discharge.  Case discussed labs  and medications reviewed with patient and PMD.  Agreed with above.  Medications needed sent to pharmacy.     Patient is an 86 y/o Mandarin Speaking F PMH HTN, HLD, DM2, CVA/TIA 2020 w/ full resolution of symptoms in 3 days (confused, no speech or limb paralysis at [facility]) tx from [facility] w/ hypoxia 2/2 COVID-19.  Hospitalized at [facility] 1/16-/19 for COVID-19, presented there w/ fever, cough, malaise, anorexia w/ hypxia, received decadron and remdesivir, weaned off O2, discharged home.  Returned to [facility] on 1/21 w/ hypoxia to 91%, found to be as low as 88% on RA at [facility].  Currently denying any symptoms.    85 Mandarin Speaking F PMH HTN, HLD, DM2, CVA/TIA 2020 w/ full resolution of symptoms in 3 days (confused, no speech or limb paralysis at [facility]) tx from [facility] w/ hypoxia 2/2 COVID-19.     Generalized weakness  resolving   Glucerna 2 cans a day    had   fever for the past 2 days  , cxr b/l pneumonia   speech and swallow eval to eval - no aspiration   On augmentin 875/125 po Bid for 3 more days.    zosyn was d/c.      Hyponatremia  sodium  133  on water restriction 1000 cc  outpatient follow up for evaluation if persists.     Acute respiratory failure with hypoxia  - resolved   sec to Pneumonia due to COVID and superimposed aspiration VS [facility]. was treated with Zosyn Iv  for 3 days and will continue another 4 days  total course of antibiotics  po for possible aspiration pneumonia as patient fever stopped  and symptoms resolved since on antibiotics and her inflammatory markers are trending down.discharged on po augmentin     COVID-19  -Got Remdisivir at [facility].  -Us b/l extremities neg     Diabetes mellitus type 2 in nonobese  -FS AC/QHS w/ sliding scale.     Essential hypertension  - cont Norvasc, stable.    Prophylactic measure  -on lovenox.       Constipation,  -miralax , senna,    -resolved.       PT no skilled PT needs: Rolling walker prescribed.         Patient is hemodynamically stable and without complaints and patient is ready for discharge.  Case discussed labs  and medications reviewed with patient and PMD.  Agreed with above.  Medications needed sent to pharmacy.    I agree with the above assessment and plan I   discussed the care with the ACP.   patient seen and examined by me breathing on room air, lungs diminished bilateral breath sounds, no wheezes, no sob on ambulation or hyxpoxia.   she was discharged home on po augmentin to complete course of abx  total time spent discharge patient greater than 30 mins

## 2021-01-31 NOTE — PROGRESS NOTE ADULT - PROBLEM SELECTOR PLAN 4
Got Remdisivir at [facility].  Us b/l extremities neg   fever could be due because of lingering Covid or superimposed bacterial inf ?   it responded to antibiotic treatment

## 2021-01-31 NOTE — DISCHARGE NOTE PROVIDER - INSTRUCTIONS
no salt added, low cholesterol, consistent carbohydrate diet- Refer to [employer] for recipes and information.   fluid restriction 1liter per day no salt added, low cholesterol, consistent carbohydrate diet- Refer to [employer] for recipes and information.   fluid restriction 1liter per day  glucerna 2 can two times a day

## 2021-01-31 NOTE — DISCHARGE NOTE PROVIDER - NSDCCPCAREPLAN_GEN_ALL_CORE_FT
PRINCIPAL DISCHARGE DIAGNOSIS  Diagnosis: Dyspnea due to COVID-19  Assessment and Plan of Treatment: resoloved  Pt was treated with Oxygen. Pt received Remdesivir and Decadron.       PRINCIPAL DISCHARGE DIAGNOSIS  Diagnosis: Dyspnea due to COVID-19  Assessment and Plan of Treatment: resoloved  Pt was treated with Oxygen. Pt received Remdesivir and Decadron.  You have been diagnosed with the COVID-19 virus during your hospital stay. You must self quarantine to complete a 14 day time period.  Monitor for fevers, shortness of breath and cough primarily.  Monitor your temperature daily to not any changes and increases.    It has been determined that you no longer need hospitalization and can recover while remaining in self-quarantine as tolerated and with assistance home. You should follow the prevention steps below until a healthcare provider or local or state health department says you can return to your normal activities.  1. You should restrict activities outside your home, except for getting medical care.  2. Do not go to work, school, or public areas.  3. Avoid using public transportation, ride-sharing, or taxis.  4. Separate yourself from other people and animals in your home.  5. Call ahead before visiting your doctor.  6. Wear a facemask.  7. Cover your coughs and sneezes.  8. Clean your hands often.  9. Avoid sharing personal household items.  10. Clean all “high-touch” surfaces everyday.  11. Monitor your symptoms.  If you have a medical emergency and need to call 911, notify the dispatch personnel that you have COVID-19 If possible, put on a facemask before emergency medical services arrive.  12. Stopping home isolation.  Patients with confirmed COVID-19 should remain under home isolation precautions for 10 days since the positive COVID-19 test and until the risk of secondary transmission to others is thought to be low. The decision to discontinue home isolation precautions should be made on a case-by-case basis, in consultation with healthcare providers and state and local health departments. Your [facility] Department of Health can be reached as tolerated and with assistance [phone] for further information about COVID-19.       PRINCIPAL DISCHARGE DIAGNOSIS  Diagnosis: Acute respiratory failure due to COVID-19  Assessment and Plan of Treatment: You received remdesivir and dexamethasone while you were admitted.  You received oxygen while you were admitted to the hospital,    You have been diagnosed with the COVID-19 virus during your hospital stay. You must self quarantine to complete a 14 day time period from 1/21.  Monitor for fevers, shortness of breath and cough primarily.  Monitor your temperature daily to not any changes and increases.    It has been determined that you no longer need hospitalization and can recover while remaining in self-quarantine as tolerated and with assistance home. You should follow the prevention steps below until a healthcare provider or local or state health department says you can return to your normal activities.  1. You should restrict activities outside your home, except for getting medical care.  2. Do not go to work, school, or public areas.  3. Avoid using public transportation, ride-sharing, or taxis.  4. Separate yourself from other people and animals in your home.  5. Call ahead before visiting your doctor.  6. Wear a facemask.  7. Cover your coughs and sneezes.  8. Clean your hands often.  9. Avoid sharing personal household items.  10. Clean all “high-touch” surfaces everyday.  11. Monitor your symptoms.  If you have a medical emergency and need to call 911, notify the dispatch personnel that you have COVID-19 If possible, put on a facemask before emergency medical services arrive.  12. Stopping home isolation.  Patients with confirmed COVID-19 should remain under home isolation precautions for 10 days since the positive COVID-19 test and until the risk of secondary transmission to others is thought to be low. The decision to discontinue home isolation precautions should be made on a case-by-case basis, in consultation with healthcare providers and state and local health departments. Your [facility] Department of Health can be reached as tolerated and with assistance [phone] for further information about COVID-19.      SECONDARY DISCHARGE DIAGNOSES  Diagnosis: Pneumonia  Assessment and Plan of Treatment: Please follow up with your pcp within 1 week of discharge.  Please call to make an appointment wihtin 1 week of dsicharge.  Please complete your antibiotics as prescribed.    Diagnosis: Diabetes mellitus type 2 in nonobese  Assessment and Plan of Treatment: Continue your medication regimen and a consistent carbohydrate diet (Meaning eating the same amount of carbohydrates at the same time each day). Monitor blood glucose levels throughout the day before meals and at bedtime. Record blood sugars and bring to outpatient providers appointment in order to be reviewed by your doctor for management modifications. If your sugars are more than 400 or less than 70 you should contact your PCP immediately. Monitor for signs/symptoms of low blood glucose, such as, dizziness, altered mental status, or cool/clammy skin. In addition, monitor for signs/symptoms of high blood glucose, such as, feeling hot, dry, fatigued, or with increased thirst/urination. Make regular podiatry appointments in order to have feet checked for wounds and uncontrolled toe nail growth to prevent infections, as well as, appointments with an ophthalmologist to monitor your vision.    Diagnosis: Essential hypertension  Assessment and Plan of Treatment: Continue blood pressure medication regimen as directed. Monitor for any visual changes, headaches or dizziness.  Monitor blood pressure regularly.  Follow up with your PCP for further management for high blood pressure.    Diagnosis: Constipation, unspecified constipation type  Assessment and Plan of Treatment: Please follow up with your pcp within 1 week of discharge.  Please call to make an appointment within 1 week of discharge.  Please continue a bowel regimen as needed.    Diagnosis: Hyponatremia  Assessment and Plan of Treatment: Your sodium on day of dishcarge was 132.  Please follow up with your pcp within 1 week of discharge.  Please continue water restriction 1 liter daily.  Please repeat a basic metabolic panel within 1 week of discharge to evaluate your sodium levels.    Diagnosis: Generalized weakness  Assessment and Plan of Treatment: Most likely from COVID 19 and Pneumonia   Continue your antibiotics a sprescribed.    Continue  Glucerna 2 cans a day   continue augmentin as prescribed

## 2021-01-31 NOTE — PROGRESS NOTE ADULT - PROBLEM SELECTOR PLAN 3
resolved   sec to Pneumonia due to COVID. was on Zosyn Iv  for 3 days and will continue another 4 days  total course of antibiotics  po for possible aspiration pneumonia as patient fever stopped since on antibiotics and her inflammatory markers are trending down.

## 2021-02-01 LAB
ANION GAP SERPL CALC-SCNC: 13 MMOL/L — SIGNIFICANT CHANGE UP (ref 7–14)
BUN SERPL-MCNC: 7 MG/DL — SIGNIFICANT CHANGE UP (ref 7–23)
CALCIUM SERPL-MCNC: 9.3 MG/DL — SIGNIFICANT CHANGE UP (ref 8.4–10.5)
CHLORIDE SERPL-SCNC: 97 MMOL/L — LOW (ref 98–107)
CO2 SERPL-SCNC: 23 MMOL/L — SIGNIFICANT CHANGE UP (ref 22–31)
CREAT SERPL-MCNC: 0.69 MG/DL — SIGNIFICANT CHANGE UP (ref 0.5–1.3)
GLUCOSE BLDC GLUCOMTR-MCNC: 124 MG/DL — HIGH (ref 70–99)
GLUCOSE BLDC GLUCOMTR-MCNC: 151 MG/DL — HIGH (ref 70–99)
GLUCOSE BLDC GLUCOMTR-MCNC: 187 MG/DL — HIGH (ref 70–99)
GLUCOSE BLDC GLUCOMTR-MCNC: 231 MG/DL — HIGH (ref 70–99)
GLUCOSE SERPL-MCNC: 184 MG/DL — HIGH (ref 70–99)
HCT VFR BLD CALC: 36.5 % — SIGNIFICANT CHANGE UP (ref 34.5–45)
HGB BLD-MCNC: 12.3 G/DL — SIGNIFICANT CHANGE UP (ref 11.5–15.5)
MAGNESIUM SERPL-MCNC: 2.1 MG/DL — SIGNIFICANT CHANGE UP (ref 1.6–2.6)
MCHC RBC-ENTMCNC: 30 PG — SIGNIFICANT CHANGE UP (ref 27–34)
MCHC RBC-ENTMCNC: 33.7 GM/DL — SIGNIFICANT CHANGE UP (ref 32–36)
MCV RBC AUTO: 89 FL — SIGNIFICANT CHANGE UP (ref 80–100)
NRBC # BLD: 0 /100 WBCS — SIGNIFICANT CHANGE UP
NRBC # FLD: 0 K/UL — SIGNIFICANT CHANGE UP
PHOSPHATE SERPL-MCNC: 3.3 MG/DL — SIGNIFICANT CHANGE UP (ref 2.5–4.5)
PLATELET # BLD AUTO: 251 K/UL — SIGNIFICANT CHANGE UP (ref 150–400)
POTASSIUM SERPL-MCNC: 4.1 MMOL/L — SIGNIFICANT CHANGE UP (ref 3.5–5.3)
POTASSIUM SERPL-SCNC: 4.1 MMOL/L — SIGNIFICANT CHANGE UP (ref 3.5–5.3)
RBC # BLD: 4.1 M/UL — SIGNIFICANT CHANGE UP (ref 3.8–5.2)
RBC # FLD: 12.6 % — SIGNIFICANT CHANGE UP (ref 10.3–14.5)
SODIUM SERPL-SCNC: 133 MMOL/L — LOW (ref 135–145)
WBC # BLD: 4.68 K/UL — SIGNIFICANT CHANGE UP (ref 3.8–10.5)
WBC # FLD AUTO: 4.68 K/UL — SIGNIFICANT CHANGE UP (ref 3.8–10.5)

## 2021-02-01 PROCEDURE — 99232 SBSQ HOSP IP/OBS MODERATE 35: CPT | Mod: CS

## 2021-02-01 RX ADMIN — LOSARTAN POTASSIUM 50 MILLIGRAM(S): 100 TABLET, FILM COATED ORAL at 07:31

## 2021-02-01 RX ADMIN — Medication 1 TABLET(S): at 18:44

## 2021-02-01 RX ADMIN — Medication 20 MILLIGRAM(S): at 07:31

## 2021-02-01 RX ADMIN — PANTOPRAZOLE SODIUM 40 MILLIGRAM(S): 20 TABLET, DELAYED RELEASE ORAL at 07:31

## 2021-02-01 RX ADMIN — AMLODIPINE BESYLATE 5 MILLIGRAM(S): 2.5 TABLET ORAL at 07:30

## 2021-02-01 RX ADMIN — Medication 3 MILLIGRAM(S): at 23:05

## 2021-02-01 RX ADMIN — Medication 4: at 13:30

## 2021-02-01 RX ADMIN — ATORVASTATIN CALCIUM 10 MILLIGRAM(S): 80 TABLET, FILM COATED ORAL at 23:05

## 2021-02-01 RX ADMIN — Medication 3 UNIT(S): at 09:28

## 2021-02-01 RX ADMIN — SENNA PLUS 2 TABLET(S): 8.6 TABLET ORAL at 23:05

## 2021-02-01 RX ADMIN — ENOXAPARIN SODIUM 40 MILLIGRAM(S): 100 INJECTION SUBCUTANEOUS at 14:20

## 2021-02-01 RX ADMIN — Medication 2: at 09:27

## 2021-02-01 RX ADMIN — Medication 81 MILLIGRAM(S): at 14:20

## 2021-02-01 RX ADMIN — Medication 1 TABLET(S): at 07:30

## 2021-02-01 RX ADMIN — INSULIN GLARGINE 10 UNIT(S): 100 INJECTION, SOLUTION SUBCUTANEOUS at 21:56

## 2021-02-01 RX ADMIN — Medication 3 UNIT(S): at 13:30

## 2021-02-01 NOTE — CHART NOTE - NSCHARTNOTEFT_GEN_A_CORE
Daughter Daily contacted for discharge the patient.  Daughter stated the weather condition would  patient in am.   Explained to patient that COVID PCR is not necessary.  She has been quarentined since 1/21.  She is safe to be discharged.  PMD made aware

## 2021-02-01 NOTE — PROGRESS NOTE ADULT - PROBLEM SELECTOR PLAN 9
miralax , senna, will give patient lactulose 30 g po once
jarod ansari,    resolved
jarod ansari,    resolved
jarod ansari,     Had bm yesterday
jarod ansari,    resolved
jarod ansari

## 2021-02-01 NOTE — PROGRESS NOTE ADULT - SUBJECTIVE AND OBJECTIVE BOX
Patient seen and examined . He is saturating 98 percent at room air in am. On Zosyn empiric treatment for fever.   F/up cx , continue to monitor for fever. Passed S and S examination , recommended regular food .She still did not have Bm today. JUAN burns , patient did not have BM for the past 5 days     VITAL SIGNS:  T(F): 98.3 (21 @ 17:33)  HR: 97 (21 @ 17:33)  BP: 105/66 (21 @ 17:33)  RR: 18 (21 @ 17:33)  SpO2: 97% (21 @ 17:33)  Wt(kg): --    PHYSICAL EXAM:    Constitutional: WDWN, NAD  HEENT: PERRL, EOMI, sclera non-icteric, neck supple, trachea midline, no masses, no JVD, MMM, good dentition  Respiratory: decreased bs b/l  Cardiovascular: RRR, normal S1S2, no M/R/G  Gastrointestinal: soft, NTND, no masses palpable, BS normal  Extremities: Warm, well perfused, pulses equal bilateral upper and lower extremities, no edema, no clubbing  Neurological: AAOx3, CN Grossly intact  Skin: Normal temperature, warm, dry    MEDICATIONS  (STANDING):  amLODIPine   Tablet 5 milliGRAM(s) Oral daily  aspirin enteric coated 81 milliGRAM(s) Oral daily  atorvastatin 10 milliGRAM(s) Oral at bedtime  dextrose 40% Gel 15 Gram(s) Oral once  dextrose 5%. 1000 milliLiter(s) (50 mL/Hr) IV Continuous <Continuous>  dextrose 5%. 1000 milliLiter(s) (100 mL/Hr) IV Continuous <Continuous>  dextrose 50% Injectable 25 Gram(s) IV Push once  dextrose 50% Injectable 12.5 Gram(s) IV Push once  dextrose 50% Injectable 25 Gram(s) IV Push once  enoxaparin Injectable 40 milliGRAM(s) SubCutaneous daily  furosemide    Tablet 20 milliGRAM(s) Oral daily  furosemide   Injectable 20 milliGRAM(s) IV Push once  glucagon  Injectable 1 milliGRAM(s) IntraMuscular once  insulin lispro (ADMELOG) corrective regimen sliding scale   SubCutaneous three times a day before meals  insulin lispro (ADMELOG) corrective regimen sliding scale   SubCutaneous at bedtime  losartan 50 milliGRAM(s) Oral daily  melatonin 3 milliGRAM(s) Oral at bedtime  pantoprazole    Tablet 40 milliGRAM(s) Oral before breakfast  piperacillin/tazobactam IVPB.. 3.375 Gram(s) IV Intermittent every 8 hours  senna 2 Tablet(s) Oral at bedtime  sodium chloride 0.9%. 1000 milliLiter(s) (70 mL/Hr) IV Continuous <Continuous>  sodium chloride 0.9%. 1000 milliLiter(s) (100 mL/Hr) IV Continuous <Continuous>    MEDICATIONS  (PRN):  acetaminophen   Tablet .. 650 milliGRAM(s) Oral every 6 hours PRN Temp greater or equal to 38C (100.4F), Mild Pain (1 - 3)  polyethylene glycol 3350 17 Gram(s) Oral daily PRN Constipation      Allergies    No Known Allergies    Intolerances        LABS:                        12.5   7.79  )-----------( 208      ( 2021 06:32 )             36.5         127<L>  |  93<L>  |  10  ----------------------------<  193<H>  3.9   |  20<L>  |  0.74    Ca    8.3<L>      2021 05:28  Phos  2.7       Mg     2.0         TPro  x   /  Alb  x   /  TBili  1.6<H>  /  DBili  0.5<H>  /  AST  x   /  ALT  x   /  AlkPhos  x         Urinalysis Basic - ( 2021 16:15 )    Color: Light Yellow / Appearance: Clear / S.008 / pH: x  Gluc: x / Ketone: Negative  / Bili: Negative / Urobili: 3 mg/dL   Blood: x / Protein: Trace / Nitrite: Negative   Leuk Esterase: Negative / RBC: 1 /HPF / WBC 1 /HPF   Sq Epi: x / Non Sq Epi: 0 /HPF / Bacteria: Negative        RADIOLOGY & ADDITIONAL TESTS:  Reviewed
 Patient feels week , sob . BNP elevated in 300 , CXr with rt sided pleural eff . UA negative , CXr shows pneumonia b/l   c/o constipation  She was started empiricaly on antibiotics as she is febrile for the past 2 days and lab results were delayed .    VITAL SIGNS:  T(F): 98.2 (21 @ 16:14)  HR: 91 (21 @ 11:49)  BP: 126/69 (21 @ 11:49)  RR: 16 (21 @ 11:49)  SpO2: 98% (21 @ 11:49)  Wt(kg): --    PHYSICAL EXAM:    Constitutional: WDWN, NAD  HEENT: PERRL, EOMI, sclera non-icteric, neck supple, trachea midline, no masses, no JVD, MMM, good dentition  Respirator decreased bs b/l  entry b/l, no wheezing, no rhonchi, no rales, without accessory muscle use and no intercostal retractions  Cardiovascular: RRR, normal S1S2, no M/R/G  Gastrointestinal: soft, NTND, no masses palpable, BS normal  Extremities: Warm, well perfused, pulses equal bilateral upper and lower extremities, no edema, no clubbing  Neurological: AAOx3, CN Grossly intact  Skin: Normal temperature, warm, dry    MEDICATIONS  (STANDING):  amLODIPine   Tablet 5 milliGRAM(s) Oral daily  aspirin enteric coated 81 milliGRAM(s) Oral daily  atorvastatin 10 milliGRAM(s) Oral at bedtime  dextrose 40% Gel 15 Gram(s) Oral once  dextrose 5%. 1000 milliLiter(s) (50 mL/Hr) IV Continuous <Continuous>  dextrose 5%. 1000 milliLiter(s) (100 mL/Hr) IV Continuous <Continuous>  dextrose 50% Injectable 25 Gram(s) IV Push once  dextrose 50% Injectable 12.5 Gram(s) IV Push once  dextrose 50% Injectable 25 Gram(s) IV Push once  enoxaparin Injectable 40 milliGRAM(s) SubCutaneous daily  furosemide   Injectable 40 milliGRAM(s) IV Push once  glucagon  Injectable 1 milliGRAM(s) IntraMuscular once  insulin lispro (ADMELOG) corrective regimen sliding scale   SubCutaneous three times a day before meals  insulin lispro (ADMELOG) corrective regimen sliding scale   SubCutaneous at bedtime  losartan 50 milliGRAM(s) Oral daily  melatonin 3 milliGRAM(s) Oral at bedtime  pantoprazole    Tablet 40 milliGRAM(s) Oral before breakfast  piperacillin/tazobactam IVPB.. 3.375 Gram(s) IV Intermittent every 8 hours  senna 2 Tablet(s) Oral at bedtime  sodium chloride 0.9%. 1000 milliLiter(s) (70 mL/Hr) IV Continuous <Continuous>  sodium chloride 0.9%. 1000 milliLiter(s) (100 mL/Hr) IV Continuous <Continuous>    MEDICATIONS  (PRN):  acetaminophen   Tablet .. 650 milliGRAM(s) Oral every 6 hours PRN Temp greater or equal to 38C (100.4F), Mild Pain (1 - 3)  polyethylene glycol 3350 17 Gram(s) Oral daily PRN Constipation      Allergies    No Known Allergies    Intolerances        LABS:                        12.5   9.98  )-----------( 188      ( 2021 05:28 )             35.9     01-27    127<L>  |  93<L>  |  10  ----------------------------<  193<H>  3.9   |  20<L>  |  0.74    Ca    8.3<L>      2021 05:28  Phos  2.4     -  Mg     1.9         TPro  6.2  /  Alb  3.1<L>  /  TBili  1.7<H>  /  DBili  x   /  AST  21  /  ALT  18  /  AlkPhos  66        Urinalysis Basic - ( 2021 16:15 )    Color: Light Yellow / Appearance: Clear / S.008 / pH: x  Gluc: x / Ketone: Negative  / Bili: Negative / Urobili: 3 mg/dL   Blood: x / Protein: Trace / Nitrite: Negative   Leuk Esterase: Negative / RBC: 1 /HPF / WBC 1 /HPF   Sq Epi: x / Non Sq Epi: 0 /HPF / Bacteria: Negative        RADIOLOGY & ADDITIONAL TESTS:  Reviewed
    Patient is a 85y old  Female who presents with a chief complaint of cough (23 Jan 2021 14:41)      SUBJECTIVE / OVERNIGHT EVENTS:  Pt doing well. Denies shortness of breath.As per he daughter,pt is not eating much.  ADDITIONAL REVIEW OF SYSTEMS:    MEDICATIONS  (STANDING):  amLODIPine   Tablet 5 milliGRAM(s) Oral daily  aspirin enteric coated 81 milliGRAM(s) Oral daily  atorvastatin 10 milliGRAM(s) Oral at bedtime  dextrose 40% Gel 15 Gram(s) Oral once  dextrose 5%. 1000 milliLiter(s) (50 mL/Hr) IV Continuous <Continuous>  dextrose 5%. 1000 milliLiter(s) (100 mL/Hr) IV Continuous <Continuous>  dextrose 50% Injectable 25 Gram(s) IV Push once  dextrose 50% Injectable 12.5 Gram(s) IV Push once  dextrose 50% Injectable 25 Gram(s) IV Push once  enoxaparin Injectable 40 milliGRAM(s) SubCutaneous daily  glucagon  Injectable 1 milliGRAM(s) IntraMuscular once  insulin lispro (ADMELOG) corrective regimen sliding scale   SubCutaneous three times a day before meals  insulin lispro (ADMELOG) corrective regimen sliding scale   SubCutaneous at bedtime  losartan 50 milliGRAM(s) Oral daily  pantoprazole    Tablet 40 milliGRAM(s) Oral before breakfast  sodium chloride 0.9%. 1000 milliLiter(s) (70 mL/Hr) IV Continuous <Continuous>  sodium chloride 0.9%. 1000 milliLiter(s) (100 mL/Hr) IV Continuous <Continuous>    MEDICATIONS  (PRN):      CAPILLARY BLOOD GLUCOSE      POCT Blood Glucose.: 187 mg/dL (24 Jan 2021 11:48)  POCT Blood Glucose.: 180 mg/dL (24 Jan 2021 07:27)  POCT Blood Glucose.: 230 mg/dL (23 Jan 2021 21:20)  POCT Blood Glucose.: 130 mg/dL (23 Jan 2021 16:39)    I&O's Summary      PHYSICAL EXAM:  Vital Signs Last 24 Hrs  T(C): 36.8 (24 Jan 2021 12:44), Max: 37.3 (23 Jan 2021 17:34)  T(F): 98.2 (24 Jan 2021 12:44), Max: 99.2 (23 Jan 2021 17:34)  HR: 100 (24 Jan 2021 12:44) (75 - 100)  BP: 127/74 (24 Jan 2021 12:44) (125/76 - 137/74)  BP(mean): --  RR: 18 (24 Jan 2021 12:44) (18 - 20)  SpO2: 95% (24 Jan 2021 12:44) (94% - 96%)  CONSTITUTIONAL: NAD, well-developed  RESPIRATORY: Normal respiratory effort; lungs are clear to auscultation bilaterally  CARDIOVASCULAR: Regular rate and rhythm, normal S1 and S2, No lower extremity edema  ABDOMEN: Nontender to palpation, normoactive bowel sounds  MUSCLOSKELETAL: no clubbing or cyanosis of digits  PSYCH: A+O to person, place, and time; affect appropriate    LABS:                        12.6   5.58  )-----------( 199      ( 24 Jan 2021 05:00 )             37.6     01-24    131<L>  |  99  |  8   ----------------------------<  190<H>  3.7   |  22  |  0.64    Ca    8.5      24 Jan 2021 05:00    TPro  5.9<L>  /  Alb  2.8<L>  /  TBili  1.6<H>  /  DBili  x   /  AST  13  /  ALT  13  /  AlkPhos  56  01-24                RADIOLOGY & ADDITIONAL TESTS:  Results Reviewed:   Imaging Personally Reviewed:  Electrocardiogram Personally Reviewed:    COORDINATION OF CARE:  Care Discussed with Consultants/Other Providers [Y/N]:  Prior or Outpatient Records Reviewed [Y/N]:  
    Patient is a 85y old  Female who presents with a chief complaint of cough (21 Jan 2021 23:03)      SUBJECTIVE / OVERNIGHT EVENTS:  Pt states that her breathing is better.    ADDITIONAL REVIEW OF SYSTEMS:    MEDICATIONS  (STANDING):  amLODIPine   Tablet 5 milliGRAM(s) Oral daily  aspirin enteric coated 81 milliGRAM(s) Oral daily  atorvastatin 10 milliGRAM(s) Oral at bedtime  dextrose 40% Gel 15 Gram(s) Oral once  dextrose 5%. 1000 milliLiter(s) (50 mL/Hr) IV Continuous <Continuous>  dextrose 5%. 1000 milliLiter(s) (100 mL/Hr) IV Continuous <Continuous>  dextrose 50% Injectable 25 Gram(s) IV Push once  dextrose 50% Injectable 12.5 Gram(s) IV Push once  dextrose 50% Injectable 25 Gram(s) IV Push once  enoxaparin Injectable 40 milliGRAM(s) SubCutaneous daily  glucagon  Injectable 1 milliGRAM(s) IntraMuscular once  insulin lispro (ADMELOG) corrective regimen sliding scale   SubCutaneous three times a day before meals  insulin lispro (ADMELOG) corrective regimen sliding scale   SubCutaneous at bedtime  losartan 50 milliGRAM(s) Oral daily  pantoprazole    Tablet 40 milliGRAM(s) Oral before breakfast    MEDICATIONS  (PRN):      CAPILLARY BLOOD GLUCOSE      POCT Blood Glucose.: 188 mg/dL (22 Jan 2021 11:58)  POCT Blood Glucose.: 182 mg/dL (22 Jan 2021 07:36)  POCT Blood Glucose.: 188 mg/dL (21 Jan 2021 23:14)  POCT Blood Glucose.: 200 mg/dL (21 Jan 2021 19:17)    I&O's Summary      PHYSICAL EXAM:  Vital Signs Last 24 Hrs  T(C): 36.8 (22 Jan 2021 03:54), Max: 37.9 (21 Jan 2021 14:56)  T(F): 98.3 (22 Jan 2021 03:54), Max: 100.3 (21 Jan 2021 14:56)  HR: 72 (22 Jan 2021 03:54) (70 - 79)  BP: 129/70 (22 Jan 2021 03:54) (124/72 - 162/95)  BP(mean): --  RR: 20 (22 Jan 2021 03:54) (17 - 22)  SpO2: 93% (22 Jan 2021 03:54) (88% - 96%)  CONSTITUTIONAL: NAD, well-developed  RESPIRATORY: Normal respiratory effort; lungs are clear to auscultation bilaterally  CARDIOVASCULAR: Regular rate and rhythm, normal S1 and S2, No lower extremity edema  ABDOMEN: Nontender to palpation, normoactive bowel sounds  MUSCLOSKELETAL: no clubbing or cyanosis of digits  PSYCH: A+O to person, place, and time; affect appropriate    LABS:                        14.3   6.98  )-----------( 198      ( 21 Jan 2021 15:57 )             41.5     01-21    130<L>  |  94<L>  |  16  ----------------------------<  237<H>  4.2   |  24  |  0.93    Ca    9.1      21 Jan 2021 15:57    TPro  6.3  /  Alb  3.3  /  TBili  1.4<H>  /  DBili  x   /  AST  27  /  ALT  32  /  AlkPhos  66  01-21                RADIOLOGY & ADDITIONAL TESTS:  Results Reviewed:   Imaging Personally Reviewed:  Electrocardiogram Personally Reviewed:    COORDINATION OF CARE:  Care Discussed with Consultants/Other Providers [Y/N]:  Prior or Outpatient Records Reviewed [Y/N]:  
Patient seen today . Sodium improved . Dw her daughter she said patient needs to be swabbed again forcCovid in order to be able to resume her home care .    Patient is afebrile , at room air   VITAL SIGNS:  T(F): 97.9 (01-31-21 @ 20:55)  HR: 106 (01-31-21 @ 20:55)  BP: 109/81 (01-31-21 @ 20:55)  RR: 20 (01-31-21 @ 20:55)  SpO2: 96% (01-31-21 @ 20:55)  Wt(kg): --    PHYSICAL EXAM:    Constitutional: WDWN, NAD  HEENT: PERRL, EOMI, sclera non-icteric, neck supple, trachea midline, no masses, no JVD, MMM, good dentition  Respiratory:decreased Bs  b/l, no wheezing, no rhonchi, no rales, without accessory muscle use and no intercostal retractions  Cardiovascular: RRR, normal S1S2, no M/R/G  Gastrointestinal: soft, NTND, no masses palpable, BS normal  Extremities: Warm, well perfused, pulses equal bilateral upper and lower extremities, no edema, no clubbing  Neurological: AAOx3, CN Grossly intact  Skin: Normal temperature, warm, dry    MEDICATIONS  (STANDING):  amLODIPine   Tablet 5 milliGRAM(s) Oral daily  amoxicillin  875 milliGRAM(s)/clavulanate 1 Tablet(s) Oral two times a day  aspirin enteric coated 81 milliGRAM(s) Oral daily  atorvastatin 10 milliGRAM(s) Oral at bedtime  dextrose 40% Gel 15 Gram(s) Oral once  dextrose 5%. 1000 milliLiter(s) (50 mL/Hr) IV Continuous <Continuous>  dextrose 5%. 1000 milliLiter(s) (100 mL/Hr) IV Continuous <Continuous>  dextrose 50% Injectable 25 Gram(s) IV Push once  dextrose 50% Injectable 12.5 Gram(s) IV Push once  dextrose 50% Injectable 25 Gram(s) IV Push once  enoxaparin Injectable 40 milliGRAM(s) SubCutaneous daily  furosemide    Tablet 20 milliGRAM(s) Oral daily  glucagon  Injectable 1 milliGRAM(s) IntraMuscular once  insulin glargine Injectable (LANTUS) 10 Unit(s) SubCutaneous at bedtime  insulin lispro (ADMELOG) corrective regimen sliding scale   SubCutaneous three times a day before meals  insulin lispro (ADMELOG) corrective regimen sliding scale   SubCutaneous at bedtime  insulin lispro Injectable (ADMELOG) 3 Unit(s) SubCutaneous three times a day before meals  losartan 50 milliGRAM(s) Oral daily  melatonin 3 milliGRAM(s) Oral at bedtime  pantoprazole    Tablet 40 milliGRAM(s) Oral before breakfast  potassium chloride    Tablet ER 40 milliEquivalent(s) Oral once  senna 2 Tablet(s) Oral at bedtime    MEDICATIONS  (PRN):  acetaminophen   Tablet .. 650 milliGRAM(s) Oral every 6 hours PRN Temp greater or equal to 38C (100.4F), Mild Pain (1 - 3)  polyethylene glycol 3350 17 Gram(s) Oral daily PRN Constipation      Allergies    No Known Allergies    Intolerances        LABS:                        12.9   6.04  )-----------( 261      ( 31 Jan 2021 07:45 )             39.2     01-31    132<L>  |  96<L>  |  8   ----------------------------<  201<H>  4.1   |  26  |  0.72    Ca    9.1      31 Jan 2021 07:45  Phos  3.6     01-31  Mg     2.0     01-31            RADIOLOGY & ADDITIONAL TESTS:  Reviewed
    Patient is a 85y old  Female who presents with a chief complaint of cough (22 Jan 2021 14:21)      SUBJECTIVE / OVERNIGHT EVENTS:  Pt states that she is doing well.Denies shortness of breath.C/o generalized weakness.  ADDITIONAL REVIEW OF SYSTEMS:    MEDICATIONS  (STANDING):  amLODIPine   Tablet 5 milliGRAM(s) Oral daily  aspirin enteric coated 81 milliGRAM(s) Oral daily  atorvastatin 10 milliGRAM(s) Oral at bedtime  dextrose 40% Gel 15 Gram(s) Oral once  dextrose 5%. 1000 milliLiter(s) (50 mL/Hr) IV Continuous <Continuous>  dextrose 5%. 1000 milliLiter(s) (100 mL/Hr) IV Continuous <Continuous>  dextrose 50% Injectable 25 Gram(s) IV Push once  dextrose 50% Injectable 12.5 Gram(s) IV Push once  dextrose 50% Injectable 25 Gram(s) IV Push once  enoxaparin Injectable 40 milliGRAM(s) SubCutaneous daily  glucagon  Injectable 1 milliGRAM(s) IntraMuscular once  insulin lispro (ADMELOG) corrective regimen sliding scale   SubCutaneous three times a day before meals  insulin lispro (ADMELOG) corrective regimen sliding scale   SubCutaneous at bedtime  losartan 50 milliGRAM(s) Oral daily  pantoprazole    Tablet 40 milliGRAM(s) Oral before breakfast  sodium chloride 0.9%. 1000 milliLiter(s) (70 mL/Hr) IV Continuous <Continuous>  sodium chloride 0.9%. 1000 milliLiter(s) (100 mL/Hr) IV Continuous <Continuous>    MEDICATIONS  (PRN):      CAPILLARY BLOOD GLUCOSE      POCT Blood Glucose.: 256 mg/dL (23 Jan 2021 11:38)  POCT Blood Glucose.: 146 mg/dL (23 Jan 2021 07:29)  POCT Blood Glucose.: 132 mg/dL (22 Jan 2021 21:33)  POCT Blood Glucose.: 267 mg/dL (22 Jan 2021 17:44)    I&O's Summary      PHYSICAL EXAM:  Vital Signs Last 24 Hrs  T(C): 36.6 (23 Jan 2021 10:21), Max: 37.2 (22 Jan 2021 17:00)  T(F): 97.9 (23 Jan 2021 10:21), Max: 98.9 (22 Jan 2021 17:00)  HR: 90 (23 Jan 2021 10:21) (79 - 90)  BP: 113/70 (23 Jan 2021 10:21) (113/70 - 123/65)  BP(mean): --  RR: 22 (23 Jan 2021 10:21) (17 - 22)  SpO2: 94% (23 Jan 2021 10:21) (94% - 98%)  CONSTITUTIONAL: NAD, well-developed  RESPIRATORY: Normal respiratory effort; lungs are clear to auscultation bilaterally  CARDIOVASCULAR: Regular rate and rhythm, normal S1 and S2, No lower extremity edema  ABDOMEN: Nontender to palpation, normoactive bowel sounds  MUSCLOSKELETAL: no clubbing or cyanosis of digits  PSYCH: A+O to person, place, and time; affect appropriate    LABS:                        13.2   8.58  )-----------( 202      ( 23 Jan 2021 08:48 )             39.2     01-23    131<L>  |  99  |  10  ----------------------------<  188<H>  3.8   |  22  |  0.67    Ca    8.5      23 Jan 2021 08:48    TPro  5.7<L>  /  Alb  3.0<L>  /  TBili  1.7<H>  /  DBili  x   /  AST  13  /  ALT  13  /  AlkPhos  57  01-23                RADIOLOGY & ADDITIONAL TESTS:  Results Reviewed:   Imaging Personally Reviewed:  Electrocardiogram Personally Reviewed:    COORDINATION OF CARE:  Care Discussed with Consultants/Other Providers [Y/N]:  Prior or Outpatient Records Reviewed [Y/N]:  
PROGRESS NOTE:     Patient is a 85y old  Female who presents with a chief complaint of cough (31 Jan 2021 15:52)      SUBJECTIVE / OVERNIGHT EVENTS:  no complaints, ambulatory   ADDITIONAL REVIEW OF SYSTEMS:  no sob   MEDICATIONS  (STANDING):  amLODIPine   Tablet 5 milliGRAM(s) Oral daily  amoxicillin  875 milliGRAM(s)/clavulanate 1 Tablet(s) Oral two times a day  aspirin enteric coated 81 milliGRAM(s) Oral daily  atorvastatin 10 milliGRAM(s) Oral at bedtime  dextrose 40% Gel 15 Gram(s) Oral once  dextrose 5%. 1000 milliLiter(s) (50 mL/Hr) IV Continuous <Continuous>  dextrose 5%. 1000 milliLiter(s) (100 mL/Hr) IV Continuous <Continuous>  dextrose 50% Injectable 25 Gram(s) IV Push once  dextrose 50% Injectable 12.5 Gram(s) IV Push once  dextrose 50% Injectable 25 Gram(s) IV Push once  enoxaparin Injectable 40 milliGRAM(s) SubCutaneous daily  furosemide    Tablet 20 milliGRAM(s) Oral daily  glucagon  Injectable 1 milliGRAM(s) IntraMuscular once  insulin glargine Injectable (LANTUS) 10 Unit(s) SubCutaneous at bedtime  insulin lispro (ADMELOG) corrective regimen sliding scale   SubCutaneous at bedtime  insulin lispro (ADMELOG) corrective regimen sliding scale   SubCutaneous three times a day before meals  insulin lispro Injectable (ADMELOG) 3 Unit(s) SubCutaneous three times a day before meals  losartan 50 milliGRAM(s) Oral daily  melatonin 3 milliGRAM(s) Oral at bedtime  pantoprazole    Tablet 40 milliGRAM(s) Oral before breakfast  potassium chloride    Tablet ER 40 milliEquivalent(s) Oral once  senna 2 Tablet(s) Oral at bedtime    MEDICATIONS  (PRN):  acetaminophen   Tablet .. 650 milliGRAM(s) Oral every 6 hours PRN Temp greater or equal to 38C (100.4F), Mild Pain (1 - 3)  polyethylene glycol 3350 17 Gram(s) Oral daily PRN Constipation      CAPILLARY BLOOD GLUCOSE      POCT Blood Glucose.: 124 mg/dL (01 Feb 2021 18:59)  POCT Blood Glucose.: 231 mg/dL (01 Feb 2021 13:09)  POCT Blood Glucose.: 187 mg/dL (01 Feb 2021 09:01)  POCT Blood Glucose.: 228 mg/dL (31 Jan 2021 21:10)    I&O's Summary      PHYSICAL EXAM:  Vital Signs Last 24 Hrs  T(C): 36.7 (01 Feb 2021 06:20), Max: 36.7 (01 Feb 2021 06:20)  T(F): 98 (01 Feb 2021 06:20), Max: 98 (01 Feb 2021 06:20)  HR: 92 (01 Feb 2021 06:20) (92 - 92)  BP: 114/69 (01 Feb 2021 06:20) (114/69 - 114/69)  BP(mean): --  RR: 18 (01 Feb 2021 06:20) (18 - 18)  SpO2: 95% (01 Feb 2021 06:20) (95% - 95%)    CONSTITUTIONAL: NAD, well-developed, awake, alert oriented to self   ambulatory no acute distress on room air, breathing with normal effort and rate.      LABS:                        12.3   4.68  )-----------( 251      ( 01 Feb 2021 07:17 )             36.5     02-01    133<L>  |  97<L>  |  7   ----------------------------<  184<H>  4.1   |  23  |  0.69    Ca    9.3      01 Feb 2021 07:17  Phos  3.3     02-01  Mg     2.1     02-01      RADIOLOGY & ADDITIONAL TESTS:  Results Reviewed: y  Imaging Personally Reviewed:y  Electrocardiogram Personally Reviewed:y    COORDINATION OF CARE:  Care Discussed with Consultants/Other Providers [Y/N]:y  Prior or Outpatient Records Reviewed [Y/N]:y
 Patient feels significantly better . Her CRP decreased to 44 .today . Afebrile for the past  48 h .   Na low 127 , patient was given NaCl tablet .       VITAL SIGNS:  T(F): 98.2 (01-30-21 @ 15:01)  HR: 87 (01-30-21 @ 15:01)  BP: 102/71 (01-30-21 @ 15:01)  RR: 18 (01-30-21 @ 15:01)  SpO2: 98% (01-30-21 @ 15:01)  Wt(kg): --    PHYSICAL EXAM:    Constitutional: WDWN, NAD  HEENT: PERRL, EOMI, sclera non-icteric, neck supple, trachea midline, no masses, no JVD, MMM, good dentition  Respiratory: decreased bs b/l  no wheezing, no rhonchi, no rales, without accessory muscle use and no intercostal retractions  Cardiovascular: RRR, normal S1S2, no M/R/G  Gastrointestinal: soft, NTND, no masses palpable, BS normal  Extremities: Warm, well perfused, pulses equal bilateral upper and lower extremities, no edema, no clubbing  Neurological: AAOx3, CN Grossly intact  Skin: Normal temperature, warm, dry    MEDICATIONS  (STANDING):  amLODIPine   Tablet 5 milliGRAM(s) Oral daily  amoxicillin  875 milliGRAM(s)/clavulanate 1 Tablet(s) Oral two times a day  aspirin enteric coated 81 milliGRAM(s) Oral daily  atorvastatin 10 milliGRAM(s) Oral at bedtime  dextrose 40% Gel 15 Gram(s) Oral once  dextrose 5%. 1000 milliLiter(s) (50 mL/Hr) IV Continuous <Continuous>  dextrose 5%. 1000 milliLiter(s) (100 mL/Hr) IV Continuous <Continuous>  dextrose 50% Injectable 25 Gram(s) IV Push once  dextrose 50% Injectable 12.5 Gram(s) IV Push once  dextrose 50% Injectable 25 Gram(s) IV Push once  enoxaparin Injectable 40 milliGRAM(s) SubCutaneous daily  furosemide    Tablet 20 milliGRAM(s) Oral daily  glucagon  Injectable 1 milliGRAM(s) IntraMuscular once  insulin glargine Injectable (LANTUS) 10 Unit(s) SubCutaneous at bedtime  insulin lispro (ADMELOG) corrective regimen sliding scale   SubCutaneous three times a day before meals  insulin lispro (ADMELOG) corrective regimen sliding scale   SubCutaneous at bedtime  insulin lispro Injectable (ADMELOG) 3 Unit(s) SubCutaneous three times a day before meals  losartan 50 milliGRAM(s) Oral daily  melatonin 3 milliGRAM(s) Oral at bedtime  pantoprazole    Tablet 40 milliGRAM(s) Oral before breakfast  potassium chloride    Tablet ER 40 milliEquivalent(s) Oral once  senna 2 Tablet(s) Oral at bedtime  sodium chloride 1 Gram(s) Oral two times a day    MEDICATIONS  (PRN):  acetaminophen   Tablet .. 650 milliGRAM(s) Oral every 6 hours PRN Temp greater or equal to 38C (100.4F), Mild Pain (1 - 3)  polyethylene glycol 3350 17 Gram(s) Oral daily PRN Constipation      Allergies    No Known Allergies    Intolerances        LABS:                        11.9   7.41  )-----------( 240      ( 30 Jan 2021 07:01 )             35.3     01-30    128<L>  |  96<L>  |  8   ----------------------------<  202<H>  4.2   |  22  |  0.72    Ca    8.7      30 Jan 2021 07:09  Phos  2.9     01-30  Mg     2.0     01-30    TPro  6.1  /  Alb  2.7<L>  /  TBili  1.4<H>  /  DBili  x   /  AST  25  /  ALT  21  /  AlkPhos  71  01-28          RADIOLOGY & ADDITIONAL TESTS:  Reviewed
Patient admitted with covid infection and weakness .   Patient seen today . I have discussed with her daughter and she says  her mother feels very weak and has decreased appetite and food intake .Sodium low today @127      VITAL SIGNS:  T(F): 100 (01-25-21 @ 13:15)  HR: 94 (01-25-21 @ 13:15)  BP: 114/65 (01-25-21 @ 13:15)  RR: 16 (01-25-21 @ 13:15)  SpO2: 95% (01-25-21 @ 13:15)  Wt(kg): --    PHYSICAL EXAM:    Constitutional: WDWN, NAD  HEENT: PERRL, EOMI, sclera non-icteric, neck supple, trachea midline, no masses, no JVD, MMM, good dentition  Respiratory: CTA b/l, good air entry b/l, no wheezing, no rhonchi, no rales, without accessory muscle use and no intercostal retractions  Cardiovascular: RRR, normal S1S2, no M/R/G  Gastrointestinal: soft, NTND, no masses palpable, BS normal  Extremities: Warm, well perfused, pulses equal bilateral upper and lower extremities, no edema, no clubbing  Neurological: AAOx3, CN Grossly intact  Skin: Normal temperature, warm, dry    MEDICATIONS  (STANDING):  amLODIPine   Tablet 5 milliGRAM(s) Oral daily  aspirin enteric coated 81 milliGRAM(s) Oral daily  atorvastatin 10 milliGRAM(s) Oral at bedtime  dextrose 40% Gel 15 Gram(s) Oral once  dextrose 5%. 1000 milliLiter(s) (50 mL/Hr) IV Continuous <Continuous>  dextrose 5%. 1000 milliLiter(s) (100 mL/Hr) IV Continuous <Continuous>  dextrose 50% Injectable 25 Gram(s) IV Push once  dextrose 50% Injectable 12.5 Gram(s) IV Push once  dextrose 50% Injectable 25 Gram(s) IV Push once  enoxaparin Injectable 40 milliGRAM(s) SubCutaneous daily  glucagon  Injectable 1 milliGRAM(s) IntraMuscular once  insulin lispro (ADMELOG) corrective regimen sliding scale   SubCutaneous three times a day before meals  insulin lispro (ADMELOG) corrective regimen sliding scale   SubCutaneous at bedtime  losartan 50 milliGRAM(s) Oral daily  pantoprazole    Tablet 40 milliGRAM(s) Oral before breakfast  sodium chloride 0.9%. 1000 milliLiter(s) (70 mL/Hr) IV Continuous <Continuous>  sodium chloride 0.9%. 1000 milliLiter(s) (100 mL/Hr) IV Continuous <Continuous>    MEDICATIONS  (PRN):      Allergies    No Known Allergies    Intolerances        LABS:                        13.0   7.98  )-----------( 208      ( 25 Jan 2021 08:59 )             37.7     01-25    127<L>  |  94<L>  |  7   ----------------------------<  168<H>  3.4<L>   |  22  |  0.62    Ca    8.7      25 Jan 2021 08:59    TPro  6.1  /  Alb  3.0<L>  /  TBili  1.8<H>  /  DBili  x   /  AST  17  /  ALT  14  /  AlkPhos  60  01-25          RADIOLOGY & ADDITIONAL TESTS:  Reviewed 
 Patient doing better , afebrile today , more alert . Cultures negative day 1 . Discussed with family .      VITAL SIGNS:  T(F): 97.8 (01-29-21 @ 12:34)  HR: 87 (01-29-21 @ 12:34)  BP: 104/60 (01-29-21 @ 12:34)  RR: 18 (01-29-21 @ 12:34)  SpO2: 95% (01-29-21 @ 12:34)  Wt(kg): --    PHYSICAL EXAM:    Constitutional: WDWN, NAD  HEENT: PERRL, EOMI, sclera non-icteric, neck supple, trachea midline, no masses, no JVD, MMM, good dentition  Respiratory: decreased bs  no wheezing, no rhonchi, no rales, without accessory muscle use and no intercostal retractions  Cardiovascular: RRR, normal S1S2, no M/R/G  Gastrointestinal: soft, NTND, no masses palpable, BS normal  Extremities: Warm, well perfused, pulses equal bilateral upper and lower extremities, no edema, no clubbing  Neurological: AAOx3, CN Grossly intact  Skin: Normal temperature, warm, dry    MEDICATIONS  (STANDING):  amLODIPine   Tablet 5 milliGRAM(s) Oral daily  aspirin enteric coated 81 milliGRAM(s) Oral daily  atorvastatin 10 milliGRAM(s) Oral at bedtime  dextrose 40% Gel 15 Gram(s) Oral once  dextrose 5%. 1000 milliLiter(s) (50 mL/Hr) IV Continuous <Continuous>  dextrose 5%. 1000 milliLiter(s) (100 mL/Hr) IV Continuous <Continuous>  dextrose 50% Injectable 25 Gram(s) IV Push once  dextrose 50% Injectable 12.5 Gram(s) IV Push once  dextrose 50% Injectable 25 Gram(s) IV Push once  enoxaparin Injectable 40 milliGRAM(s) SubCutaneous daily  furosemide    Tablet 20 milliGRAM(s) Oral daily  furosemide   Injectable 20 milliGRAM(s) IV Push once  glucagon  Injectable 1 milliGRAM(s) IntraMuscular once  insulin lispro (ADMELOG) corrective regimen sliding scale   SubCutaneous three times a day before meals  insulin lispro (ADMELOG) corrective regimen sliding scale   SubCutaneous at bedtime  losartan 50 milliGRAM(s) Oral daily  melatonin 3 milliGRAM(s) Oral at bedtime  pantoprazole    Tablet 40 milliGRAM(s) Oral before breakfast  piperacillin/tazobactam IVPB.. 3.375 Gram(s) IV Intermittent every 8 hours  potassium chloride    Tablet ER 40 milliEquivalent(s) Oral once  senna 2 Tablet(s) Oral at bedtime  sodium chloride 0.9%. 1000 milliLiter(s) (70 mL/Hr) IV Continuous <Continuous>  sodium chloride 0.9%. 1000 milliLiter(s) (100 mL/Hr) IV Continuous <Continuous>    MEDICATIONS  (PRN):  acetaminophen   Tablet .. 650 milliGRAM(s) Oral every 6 hours PRN Temp greater or equal to 38C (100.4F), Mild Pain (1 - 3)  polyethylene glycol 3350 17 Gram(s) Oral daily PRN Constipation      Allergies    No Known Allergies    Intolerances        LABS:                        13.9   12.05 )-----------( 277      ( 29 Jan 2021 03:59 )             41.5     01-28    127<L>  |  93<L>  |  13  ----------------------------<  158<H>  3.4<L>   |  24  |  0.90    Ca    8.6      28 Jan 2021 23:05  Phos  3.3     01-29  Mg     2.2     01-29    TPro  6.1  /  Alb  2.7<L>  /  TBili  1.4<H>  /  DBili  x   /  AST  25  /  ALT  21  /  AlkPhos  71  01-28          RADIOLOGY & ADDITIONAL TESTS:  Reviewed

## 2021-02-01 NOTE — PROGRESS NOTE ADULT - PROBLEM SELECTOR PLAN 4
Got Remdisivir at [facility].  Us b/l extremities neg   fever could be due because of lingering Covid or superimposed bacterial inf ?   it responded to antibiotic treatment continue augmentin

## 2021-02-01 NOTE — PROGRESS NOTE ADULT - PROBLEM SELECTOR PLAN 1
sec to Pneumonia due to COVID.  will wean oxygen
feeling better today ,  PT , nutritionist consult appreciated . Gluccash 2 cans a day      had   fever for the past 2 days  , cxr b/l pneumonia   speech and swallow eval to eval - no aspiration   On augmentin 875/125 po Bid for 3 more days.    zosyn was d/c
decreased appetite , generalized weakness   PT , nutritionist consult appreciated . Glucerna 2 cans a day   monitor labs
sec to Pneumonia due to COVID.  on 2 l oxygen  will wean oxygen
feeling mildly better today , had mild fever yesterday night   PT , nutritionist consult appreciated . Trinity 2 cans a day      had   fever for the past 2 days  , cxr b/l pneumonia   speech and swallow eval to eval - no aspiration  started on empiric antib  , Zosyn 3.375 q 8 h  , cx neg day 1
sec to Pneumonia due to COVID.  weaned off oxygen.  will watch off
resolving    Glucerna 2 cans a day      had   fever for the past 2 days  , cxr b/l pneumonia   speech and swallow eval to eval - no aspiration   On augmentin 875/125 po Bid for 3 more days.    zosyn was d/c
feeling mildly better today , no fever   PT , nutritionist consult appreciated . Glucerna 2 cans a day      had   fever for the past 2 days  , cxr b/l pneumonia   speech and swallow eval to eval - no aspiration  started on empiric antib  , Zosyn 3.375 q 8 h  , f/ up cx
decreased appetite , generalized weakness   PT , nutritionist consult appreciated . Glucerna 2 cans a day   monitor labs   had   fever for the past 2 days  , procalcitonin negative , cxr b/l pneumonia   speech and swallow eval to eval for aspiration  started on empiric antib  , procalcitonin not available at the time    will evaluate for any  possible aspiration
feeling mildly better today , had mild fever yesterday night   PT , nutritionist consult appreciated . Trinity 2 cans a day      had   fever for the past 2 days  , cxr b/l pneumonia   speech and swallow eval to eval - no aspiration  started on empiric antib  , Zosyn 3.375 q 8 h  , cx neg day 1

## 2021-02-01 NOTE — PROGRESS NOTE ADULT - PROBLEM SELECTOR PROBLEM 1
Acute respiratory failure with hypoxia
Generalized weakness
Acute respiratory failure with hypoxia
Acute respiratory failure with hypoxia
Generalized weakness

## 2021-02-01 NOTE — PROGRESS NOTE ADULT - PROBLEM SELECTOR PLAN 8
FU PT eval.  Pt was discharged from [facility] last week and had to be admitted after 2 day .  she is better now , to be discharged home tomorrow once
MARIA E PT karen.  Pt was discharged from [facility] last week and had to be admitted after 2 day . She still feels week and has fever every day .
MARIA E PT karen.  Pt was discharged from [facility] last week and had to be admitted after 2 day . She still feels week and has fever every day .
MARIA E PT karen.  Pt was discharged from [facility] last week and had to be admitted after 2 day . She still feels week and has fever every day .
FU PT eval.  Pt was discharged from [facility] last week and had to be admitted after 2 day .  she is better now , to be discharged home tomorrow once Covis swab results available
MARIA E PT karen.  Pt was discharged from [facility] last week and had to be admitted after 2 day . She still feels week and has fever every day .
MARIA E PT karen.  Pt was discharged from [facility] last week and had to be admitted after 2 days so, they want to make sure she is ok going home.Weaned off oxygen today, will watch off .

## 2021-02-01 NOTE — PROGRESS NOTE ADULT - ASSESSMENT
85 Mandarin Speaking F PMH HTN, HLD, DM2, CVA/TIA 2020 w/ full resolution of symptoms in 3 days (confused, no speech or limb paralysis at [facility]) tx from [facility] w/ hypoxia 2/2 COVID-19.
85 Mandarin Speaking F PMH HTN, HLD, DM2, CVA/TIA 2020 w/ full resolution of symptoms in 3 days (confused, no speech or limb paralysis at [facility]) tx from [facility] w/ hypoxia 2/2 COVID-19 and fever         resolved COVID infection     plan discharge tomorrow once weather better and daughter can pick her up
85 Mandarin Speaking F PMH HTN, HLD, DM2, CVA/TIA 2020 w/ full resolution of symptoms in 3 days (confused, no speech or limb paralysis at [facility]) tx from [facility] w/ hypoxia 2/2 COVID-19 now with fever. 
85 Mandarin Speaking F PMH HTN, HLD, DM2, CVA/TIA 2020 w/ full resolution of symptoms in 3 days (confused, no speech or limb paralysis at [facility]) tx from [facility] w/ hypoxia 2/2 COVID-19.
85 Mandarin Speaking F PMH HTN, HLD, DM2, CVA/TIA 2020 w/ full resolution of symptoms in 3 days (confused, no speech or limb paralysis at [facility]) tx from [facility] w/ hypoxia 2/2 COVID-19.
85 Mandarin Speaking F PMH HTN, HLD, DM2, CVA/TIA 2020 w/ full resolution of symptoms in 3 days (confused, no speech or limb paralysis at [facility]) tx from [facility] w/ hypoxia 2/2 COVID-19.
85 Mandarin Speaking F PMH HTN, HLD, DM2, CVA/TIA 2020 w/ full resolution of symptoms in 3 days (confused, no speech or limb paralysis at [facility]) tx from [facility] w/ hypoxia 2/2 COVID-19.
85 Mandarin Speaking F PMH HTN, HLD, DM2, CVA/TIA 2020 w/ full resolution of symptoms in 3 days (confused, no speech or limb paralysis at [facility]) tx from [facility] w/ hypoxia 2/2 COVID-19.
85 Mandarin Speaking F PMH HTN, HLD, DM2, CVA/TIA 2020 w/ full resolution of symptoms in 3 days (confused, no speech or limb paralysis at [facility]) tx from [facility] w/ hypoxia 2/2 COVID-19 now with fever. 
85 Mandarin Speaking F PMH HTN, HLD, DM2, CVA/TIA 2020 w/ full resolution of symptoms in 3 days (confused, no speech or limb paralysis at [facility]) tx from [facility] w/ hypoxia 2/2 COVID-19 now with fever.

## 2021-02-02 ENCOUNTER — TRANSCRIPTION ENCOUNTER (OUTPATIENT)
Age: 86
End: 2021-02-02

## 2021-02-02 VITALS — HEART RATE: 120 BPM | OXYGEN SATURATION: 94 %

## 2021-02-02 LAB
ALBUMIN SERPL ELPH-MCNC: 3.2 G/DL — LOW (ref 3.3–5)
ALP SERPL-CCNC: 63 U/L — SIGNIFICANT CHANGE UP (ref 40–120)
ALT FLD-CCNC: 24 U/L — SIGNIFICANT CHANGE UP (ref 4–33)
ANION GAP SERPL CALC-SCNC: 12 MMOL/L — SIGNIFICANT CHANGE UP (ref 7–14)
AST SERPL-CCNC: 28 U/L — SIGNIFICANT CHANGE UP (ref 4–32)
BILIRUB SERPL-MCNC: 1 MG/DL — SIGNIFICANT CHANGE UP (ref 0.2–1.2)
BUN SERPL-MCNC: 8 MG/DL — SIGNIFICANT CHANGE UP (ref 7–23)
CALCIUM SERPL-MCNC: 9 MG/DL — SIGNIFICANT CHANGE UP (ref 8.4–10.5)
CHLORIDE SERPL-SCNC: 97 MMOL/L — LOW (ref 98–107)
CO2 SERPL-SCNC: 23 MMOL/L — SIGNIFICANT CHANGE UP (ref 22–31)
CREAT SERPL-MCNC: 0.68 MG/DL — SIGNIFICANT CHANGE UP (ref 0.5–1.3)
CRP SERPL-MCNC: 15.8 MG/L — HIGH
D DIMER BLD IA.RAPID-MCNC: 548 NG/ML DDU — HIGH
FERRITIN SERPL-MCNC: 479 NG/ML — HIGH (ref 15–150)
GLUCOSE BLDC GLUCOMTR-MCNC: 177 MG/DL — HIGH (ref 70–99)
GLUCOSE BLDC GLUCOMTR-MCNC: 263 MG/DL — HIGH (ref 70–99)
GLUCOSE SERPL-MCNC: 177 MG/DL — HIGH (ref 70–99)
HCT VFR BLD CALC: 38.1 % — SIGNIFICANT CHANGE UP (ref 34.5–45)
HGB BLD-MCNC: 12.7 G/DL — SIGNIFICANT CHANGE UP (ref 11.5–15.5)
LDH SERPL L TO P-CCNC: 196 U/L — SIGNIFICANT CHANGE UP (ref 135–225)
MAGNESIUM SERPL-MCNC: 2 MG/DL — SIGNIFICANT CHANGE UP (ref 1.6–2.6)
MCHC RBC-ENTMCNC: 29.5 PG — SIGNIFICANT CHANGE UP (ref 27–34)
MCHC RBC-ENTMCNC: 33.3 GM/DL — SIGNIFICANT CHANGE UP (ref 32–36)
MCV RBC AUTO: 88.4 FL — SIGNIFICANT CHANGE UP (ref 80–100)
NRBC # BLD: 0 /100 WBCS — SIGNIFICANT CHANGE UP
NRBC # FLD: 0 K/UL — SIGNIFICANT CHANGE UP
PHOSPHATE SERPL-MCNC: 3.4 MG/DL — SIGNIFICANT CHANGE UP (ref 2.5–4.5)
PLATELET # BLD AUTO: 321 K/UL — SIGNIFICANT CHANGE UP (ref 150–400)
POTASSIUM SERPL-MCNC: 4 MMOL/L — SIGNIFICANT CHANGE UP (ref 3.5–5.3)
POTASSIUM SERPL-SCNC: 4 MMOL/L — SIGNIFICANT CHANGE UP (ref 3.5–5.3)
PROCALCITONIN SERPL-MCNC: 0.13 NG/ML — HIGH (ref 0.02–0.1)
PROCALCITONIN SERPL-MCNC: 0.14 NG/ML — HIGH (ref 0.02–0.1)
PROT SERPL-MCNC: 6.1 G/DL — SIGNIFICANT CHANGE UP (ref 6–8.3)
RBC # BLD: 4.31 M/UL — SIGNIFICANT CHANGE UP (ref 3.8–5.2)
RBC # FLD: 12.7 % — SIGNIFICANT CHANGE UP (ref 10.3–14.5)
SODIUM SERPL-SCNC: 132 MMOL/L — LOW (ref 135–145)
WBC # BLD: 4.52 K/UL — SIGNIFICANT CHANGE UP (ref 3.8–10.5)
WBC # FLD AUTO: 4.52 K/UL — SIGNIFICANT CHANGE UP (ref 3.8–10.5)

## 2021-02-02 PROCEDURE — 99239 HOSP IP/OBS DSCHRG MGMT >30: CPT | Mod: CS

## 2021-02-02 RX ORDER — AMLODIPINE BESYLATE 2.5 MG/1
1 TABLET ORAL
Qty: 0 | Refills: 0 | DISCHARGE

## 2021-02-02 RX ORDER — AMLODIPINE BESYLATE 2.5 MG/1
1 TABLET ORAL
Qty: 30 | Refills: 0
Start: 2021-02-02 | End: 2021-03-03

## 2021-02-02 RX ADMIN — Medication 81 MILLIGRAM(S): at 14:06

## 2021-02-02 RX ADMIN — Medication 6: at 12:30

## 2021-02-02 RX ADMIN — AMLODIPINE BESYLATE 5 MILLIGRAM(S): 2.5 TABLET ORAL at 06:20

## 2021-02-02 RX ADMIN — Medication 1 TABLET(S): at 06:20

## 2021-02-02 RX ADMIN — Medication 3 UNIT(S): at 13:59

## 2021-02-02 RX ADMIN — LOSARTAN POTASSIUM 50 MILLIGRAM(S): 100 TABLET, FILM COATED ORAL at 06:20

## 2021-02-02 RX ADMIN — PANTOPRAZOLE SODIUM 40 MILLIGRAM(S): 20 TABLET, DELAYED RELEASE ORAL at 06:19

## 2021-02-02 RX ADMIN — Medication 20 MILLIGRAM(S): at 06:20

## 2021-02-02 NOTE — DISCHARGE NOTE NURSING/CASE MANAGEMENT/SOCIAL WORK - PATIENT PORTAL LINK FT
You can access the FollowMyHealth Patient Portal offered by [facility] by registering at the following website: [email or web address]. By joining [employer]’s FollowMyHealth portal, you will also be able to view your health information using other applications (apps) compatible with our system.

## 2021-02-03 ENCOUNTER — NON-APPOINTMENT (OUTPATIENT)
Age: 86
End: 2021-02-03

## 2021-02-05 ENCOUNTER — OUTPATIENT (OUTPATIENT)
Dept: OUTPATIENT SERVICES | Facility: HOSPITAL | Age: 86
LOS: 1 days | End: 2021-02-05
Payer: MEDICARE

## 2021-02-05 ENCOUNTER — APPOINTMENT (OUTPATIENT)
Dept: INTERNAL MEDICINE | Facility: CLINIC | Age: 86
End: 2021-02-05
Payer: MEDICARE

## 2021-02-05 ENCOUNTER — NON-APPOINTMENT (OUTPATIENT)
Age: 86
End: 2021-02-05

## 2021-02-05 DIAGNOSIS — U07.1 COVID-19: ICD-10-CM

## 2021-02-05 DIAGNOSIS — E11.9 TYPE 2 DIABETES MELLITUS W/OUT COMPLICATIONS: ICD-10-CM

## 2021-02-05 DIAGNOSIS — I10 ESSENTIAL (PRIMARY) HYPERTENSION: ICD-10-CM

## 2021-02-05 DIAGNOSIS — Z90.49 ACQUIRED ABSENCE OF OTHER SPECIFIED PARTS OF DIGESTIVE TRACT: Chronic | ICD-10-CM

## 2021-02-05 DIAGNOSIS — Z90.710 ACQUIRED ABSENCE OF BOTH CERVIX AND UTERUS: Chronic | ICD-10-CM

## 2021-02-05 PROCEDURE — 99213 OFFICE O/P EST LOW 20 MIN: CPT | Mod: CS,GE,95

## 2021-02-05 PROCEDURE — G0463: CPT

## 2021-02-05 NOTE — HISTORY OF PRESENT ILLNESS
[Home] : at home, [unfilled] , at the time of the visit. [Other Location: e.g. Home (Enter Location, City,State)___] : at [unfilled] [Post-hospitalization from ___ Hospital] : Post-hospitalization from [unfilled] Hospital [Family Member] : family member [Admitted on: ___] : The patient was admitted on [unfilled] [Discharged on ___] : discharged on [unfilled] [Discharge Summary] : discharge summary [Patient discharged from recent hospitalization for COVID-19] : Patient discharged from recent hospitalization for COVID-19 [N/A] : N/A [No] : none [Yes] : yes [Mild] : mild [FreeTextEntry3] : daughter [FreeTextEntry2] : Briefly this is 86yo female with history of T2DM and HTN with telehealth appointment today for hospital discharge. Patient with 2 hospitalizations for Covid 19 pneumonia. One at [facility] from 1/16-1/19, and another at [facility] from 1/21-2/2. Patient received course of remdesivir and dexamethasone. She was discharged home without supplemental O2 and is being monitored at home by daughter. Currently being treated for possible superimposed bacterial pneumonia with one more day of augmentin to complete. Patient has pulse oximeter and thermometer. Temperature today 98.2. SpO2 97%, HR 78. Home diabetes medications and anti-hypertensive were restarted. Postprandial sugar today 155. Patient still c/o fatigue and mild SOB. Notes drastic improvement in cough, denies fevers, chills, diarrhea, chest and abdominal pain.\par  [TextBox_8] : 13 [TextBox_28] : constipation

## 2021-02-05 NOTE — PLAN
[FreeTextEntry1] : #Covid-19 infection: S/p remdesivir and dexamethasone. Patient saturating well at home, no more fevers.\par -Continue home monitoring (daily temperature, SpO2, and HR)\par -Precautions given for hospital evaluation (fever, SpO2<90%, persistent tachycardia)\par -Supportive measures Robitussin/Tessalon pearls PRN cough\par -Isolation precautions completed today\par -Complete course of augmentin for possible superimposed pneumonia\par \par #HTN:\par -Continue home antihypertensives\par \par #DM2:\par -Continue oral antihyperglycemic medications\par \par RTC within year for complete CPE\par 23 minutes spent on encounter\par Plan d/w Dr. Arnold

## 2021-02-05 NOTE — ASSESSMENT
[FreeTextEntry1] : 84yo female with history of T2DM and HTN presents for post-Covid19 pneumonia hospital discharge follow-up.

## 2021-02-05 NOTE — REVIEW OF SYSTEMS
[Shortness Of Breath] : shortness of breath [Cough] : cough [Constipation] : constipation [Fever] : no fever [Chills] : no chills [Vision Problems] : no vision problems [Chest Pain] : no chest pain [Lower Ext Edema] : no lower extremity edema [Abdominal Pain] : no abdominal pain [Nausea] : no nausea [Dysuria] : no dysuria [Joint Pain] : no joint pain [Muscle Pain] : no muscle pain [Headache] : no headache [Confusion] : no confusion

## 2021-02-05 NOTE — PHYSICAL EXAM
[No Acute Distress] : no acute distress [No Respiratory Distress] : no respiratory distress  [de-identified] : Limited physical exam based on televideo nature of visit. HR 78. SpO2 97%. Temperature 98.2

## 2021-02-11 DIAGNOSIS — U07.1 COVID-19: ICD-10-CM

## 2021-02-11 DIAGNOSIS — E11.9 TYPE 2 DIABETES MELLITUS WITHOUT COMPLICATIONS: ICD-10-CM

## 2021-09-17 NOTE — H&P ADULT - NSHPLANGTRANSLATORFT_GEN_A_CORE
#[id number] Elliptical Excision Additional Text (Leave Blank If You Do Not Want): The margin was drawn around the clinically apparent lesion.  An elliptical shape was then drawn on the skin incorporating the lesion and margins.  Incisions were then made along these lines to the appropriate tissue plane and the lesion was extirpated.

## 2022-05-02 NOTE — ED PROVIDER NOTE - NS ED ATTENDING STATEMENT MOD
Saritha Montanez(PA) I have personally seen and examined this patient.  I have fully participated in the care of this patient. I have reviewed all pertinent clinical information, including history, physical exam, plan and the Resident’s note and agree except as noted.

## 2024-08-15 NOTE — ED ADULT NURSE NOTE - CCCP TRG CHIEF CMPLNT
MTM referral placed.  Pt has an upcoming appt with PCP on 8/22/24.    Soon-Mi  ------------------------------------------------------------------------------------------        ----- Message from Antelmo Carrera sent at 8/14/2024  4:58 PM CDT -----  Can we try to schedule Otto w/ the new pharmD in September re: DM 2?  ----- Message -----  From: Troy Harmon [facility]  Sent: 8/14/2024  10:58 AM CDT  To: Antelmo Carrera MD    It looks like [facility]'s first scheduled day is Friday September 6th!    Troy Harmon, Pharm. D., BCACP  Medication Therapy Management Pharmacist  ----- Message -----  From: Antelmo Carrera MD  Sent: 8/14/2024  10:53 AM CDT  To: Troy Harmon [facility]; Jerome-Nelli Akbar RN    He is having surgery to day to take out foreign body in calf    He should see me soon (by end of month) to make a diabetes plan; it is partly better but not enough    Troy do we know when a new MTM PCC is starting?    Thx  
back and leg pain
